# Patient Record
Sex: MALE | Race: OTHER | URBAN - METROPOLITAN AREA
[De-identification: names, ages, dates, MRNs, and addresses within clinical notes are randomized per-mention and may not be internally consistent; named-entity substitution may affect disease eponyms.]

---

## 2017-04-17 ENCOUNTER — OUTPATIENT (OUTPATIENT)
Dept: OUTPATIENT SERVICES | Facility: HOSPITAL | Age: 77
LOS: 1 days | Discharge: HOME | End: 2017-04-17

## 2017-06-27 DIAGNOSIS — R53.82 CHRONIC FATIGUE, UNSPECIFIED: ICD-10-CM

## 2018-04-05 ENCOUNTER — OUTPATIENT (OUTPATIENT)
Dept: OUTPATIENT SERVICES | Facility: HOSPITAL | Age: 78
LOS: 1 days | Discharge: HOME | End: 2018-04-05

## 2018-04-06 DIAGNOSIS — R10.11 RIGHT UPPER QUADRANT PAIN: ICD-10-CM

## 2018-04-06 DIAGNOSIS — E78.2 MIXED HYPERLIPIDEMIA: ICD-10-CM

## 2018-04-06 DIAGNOSIS — I11.9 HYPERTENSIVE HEART DISEASE WITHOUT HEART FAILURE: ICD-10-CM

## 2018-10-03 ENCOUNTER — OUTPATIENT (OUTPATIENT)
Dept: OUTPATIENT SERVICES | Facility: HOSPITAL | Age: 78
LOS: 1 days | Discharge: HOME | End: 2018-10-03

## 2018-10-04 DIAGNOSIS — I11.9 HYPERTENSIVE HEART DISEASE WITHOUT HEART FAILURE: ICD-10-CM

## 2018-10-04 DIAGNOSIS — R10.11 RIGHT UPPER QUADRANT PAIN: ICD-10-CM

## 2018-10-04 DIAGNOSIS — E78.2 MIXED HYPERLIPIDEMIA: ICD-10-CM

## 2021-12-26 ENCOUNTER — INPATIENT (INPATIENT)
Facility: HOSPITAL | Age: 81
LOS: 8 days | Discharge: HOME | End: 2022-01-04
Attending: UROLOGY | Admitting: UROLOGY
Payer: MEDICARE

## 2021-12-26 VITALS
DIASTOLIC BLOOD PRESSURE: 86 MMHG | SYSTOLIC BLOOD PRESSURE: 119 MMHG | HEIGHT: 68 IN | RESPIRATION RATE: 20 BRPM | TEMPERATURE: 98 F | WEIGHT: 119.93 LBS | OXYGEN SATURATION: 98 % | HEART RATE: 98 BPM

## 2021-12-26 DIAGNOSIS — Z98.890 OTHER SPECIFIED POSTPROCEDURAL STATES: Chronic | ICD-10-CM

## 2021-12-26 LAB
ALBUMIN SERPL ELPH-MCNC: 3.6 G/DL — SIGNIFICANT CHANGE UP (ref 3.3–5)
ALP SERPL-CCNC: 55 U/L — SIGNIFICANT CHANGE UP (ref 40–120)
ALT FLD-CCNC: 17 U/L — SIGNIFICANT CHANGE UP (ref 12–78)
ANION GAP SERPL CALC-SCNC: 5 MMOL/L — SIGNIFICANT CHANGE UP (ref 5–17)
APPEARANCE UR: ABNORMAL
APTT BLD: 21.1 SEC — LOW (ref 27.5–35.5)
AST SERPL-CCNC: 18 U/L — SIGNIFICANT CHANGE UP (ref 15–37)
BACTERIA # UR AUTO: ABNORMAL
BASOPHILS # BLD AUTO: 0.01 K/UL — SIGNIFICANT CHANGE UP (ref 0–0.2)
BASOPHILS NFR BLD AUTO: 0.2 % — SIGNIFICANT CHANGE UP (ref 0–2)
BILIRUB SERPL-MCNC: 0.5 MG/DL — SIGNIFICANT CHANGE UP (ref 0.2–1.2)
BILIRUB UR-MCNC: NEGATIVE — SIGNIFICANT CHANGE UP
BUN SERPL-MCNC: 16 MG/DL — SIGNIFICANT CHANGE UP (ref 7–23)
CALCIUM SERPL-MCNC: 9.2 MG/DL — SIGNIFICANT CHANGE UP (ref 8.5–10.1)
CHLORIDE SERPL-SCNC: 105 MMOL/L — SIGNIFICANT CHANGE UP (ref 96–108)
CO2 SERPL-SCNC: 28 MMOL/L — SIGNIFICANT CHANGE UP (ref 22–31)
COLOR SPEC: ABNORMAL
CREAT SERPL-MCNC: 1.27 MG/DL — SIGNIFICANT CHANGE UP (ref 0.5–1.3)
DIFF PNL FLD: ABNORMAL
EOSINOPHIL # BLD AUTO: 0 K/UL — SIGNIFICANT CHANGE UP (ref 0–0.5)
EOSINOPHIL NFR BLD AUTO: 0 % — SIGNIFICANT CHANGE UP (ref 0–6)
EPI CELLS # UR: SIGNIFICANT CHANGE UP
FLUAV AG NPH QL: SIGNIFICANT CHANGE UP
FLUBV AG NPH QL: SIGNIFICANT CHANGE UP
GLUCOSE SERPL-MCNC: 126 MG/DL — HIGH (ref 70–99)
GLUCOSE UR QL: NEGATIVE MG/DL — SIGNIFICANT CHANGE UP
HCT VFR BLD CALC: 33.7 % — LOW (ref 39–50)
HGB BLD-MCNC: 11.6 G/DL — LOW (ref 13–17)
IMM GRANULOCYTES NFR BLD AUTO: 0.2 % — SIGNIFICANT CHANGE UP (ref 0–1.5)
INR BLD: 1.11 RATIO — SIGNIFICANT CHANGE UP (ref 0.88–1.16)
KETONES UR-MCNC: ABNORMAL
LEUKOCYTE ESTERASE UR-ACNC: ABNORMAL
LYMPHOCYTES # BLD AUTO: 0.49 K/UL — LOW (ref 1–3.3)
LYMPHOCYTES # BLD AUTO: 7.9 % — LOW (ref 13–44)
MCHC RBC-ENTMCNC: 31.4 PG — SIGNIFICANT CHANGE UP (ref 27–34)
MCHC RBC-ENTMCNC: 34.4 GM/DL — SIGNIFICANT CHANGE UP (ref 32–36)
MCV RBC AUTO: 91.1 FL — SIGNIFICANT CHANGE UP (ref 80–100)
MONOCYTES # BLD AUTO: 0.26 K/UL — SIGNIFICANT CHANGE UP (ref 0–0.9)
MONOCYTES NFR BLD AUTO: 4.2 % — SIGNIFICANT CHANGE UP (ref 2–14)
NEUTROPHILS # BLD AUTO: 5.45 K/UL — SIGNIFICANT CHANGE UP (ref 1.8–7.4)
NEUTROPHILS NFR BLD AUTO: 87.5 % — HIGH (ref 43–77)
NITRITE UR-MCNC: POSITIVE
NRBC # BLD: 0 /100 WBCS — SIGNIFICANT CHANGE UP (ref 0–0)
PH UR: 7 — SIGNIFICANT CHANGE UP (ref 5–8)
PLATELET # BLD AUTO: 126 K/UL — LOW (ref 150–400)
POTASSIUM SERPL-MCNC: 4.6 MMOL/L — SIGNIFICANT CHANGE UP (ref 3.5–5.3)
POTASSIUM SERPL-SCNC: 4.6 MMOL/L — SIGNIFICANT CHANGE UP (ref 3.5–5.3)
PROT SERPL-MCNC: 7.7 GM/DL — SIGNIFICANT CHANGE UP (ref 6–8.3)
PROT UR-MCNC: 500 MG/DL
PROTHROM AB SERPL-ACNC: 12.8 SEC — SIGNIFICANT CHANGE UP (ref 10.6–13.6)
RBC # BLD: 3.7 M/UL — LOW (ref 4.2–5.8)
RBC # FLD: 12.5 % — SIGNIFICANT CHANGE UP (ref 10.3–14.5)
RBC CASTS # UR COMP ASSIST: >50 /HPF (ref 0–4)
SARS-COV-2 RNA SPEC QL NAA+PROBE: SIGNIFICANT CHANGE UP
SODIUM SERPL-SCNC: 138 MMOL/L — SIGNIFICANT CHANGE UP (ref 135–145)
SP GR SPEC: 1.01 — SIGNIFICANT CHANGE UP (ref 1.01–1.02)
UROBILINOGEN FLD QL: NEGATIVE MG/DL — SIGNIFICANT CHANGE UP
WBC # BLD: 6.22 K/UL — SIGNIFICANT CHANGE UP (ref 3.8–10.5)
WBC # FLD AUTO: 6.22 K/UL — SIGNIFICANT CHANGE UP (ref 3.8–10.5)
WBC UR QL: SIGNIFICANT CHANGE UP

## 2021-12-26 PROCEDURE — 74177 CT ABD & PELVIS W/CONTRAST: CPT | Mod: 26

## 2021-12-26 PROCEDURE — 99285 EMERGENCY DEPT VISIT HI MDM: CPT

## 2021-12-26 PROCEDURE — 88112 CYTOPATH CELL ENHANCE TECH: CPT | Mod: 26

## 2021-12-26 RX ORDER — TAMSULOSIN HYDROCHLORIDE 0.4 MG/1
0.4 CAPSULE ORAL EVERY 12 HOURS
Refills: 0 | Status: DISCONTINUED | OUTPATIENT
Start: 2021-12-26 | End: 2021-12-30

## 2021-12-26 RX ORDER — LIDOCAINE HCL 20 MG/ML
10 VIAL (ML) INJECTION ONCE
Refills: 0 | Status: COMPLETED | OUTPATIENT
Start: 2021-12-26 | End: 2021-12-26

## 2021-12-26 RX ORDER — TAMSULOSIN HYDROCHLORIDE 0.4 MG/1
1 CAPSULE ORAL
Qty: 0 | Refills: 0 | DISCHARGE

## 2021-12-26 RX ORDER — LIDOCAINE HCL 20 MG/ML
1 VIAL (ML) INJECTION ONCE
Refills: 0 | Status: DISCONTINUED | OUTPATIENT
Start: 2021-12-26 | End: 2021-12-26

## 2021-12-26 RX ORDER — LIDOCAINE HCL 20 MG/ML
5 VIAL (ML) INJECTION ONCE
Refills: 0 | Status: COMPLETED | OUTPATIENT
Start: 2021-12-26 | End: 2021-12-26

## 2021-12-26 RX ADMIN — Medication 10 MILLILITER(S): at 17:55

## 2021-12-26 RX ADMIN — Medication 10 MILLILITER(S): at 17:56

## 2021-12-26 RX ADMIN — TAMSULOSIN HYDROCHLORIDE 0.4 MILLIGRAM(S): 0.4 CAPSULE ORAL at 18:46

## 2021-12-26 NOTE — H&P ADULT - NSHPPHYSICALEXAM_GEN_ALL_CORE
Vital Signs Last 24 Hrs  T(C): 36.7 (26 Dec 2021 15:54), Max: 36.8 (26 Dec 2021 10:20)  T(F): 98.1 (26 Dec 2021 15:54), Max: 98.2 (26 Dec 2021 10:20)  HR: 91 (26 Dec 2021 15:54) (91 - 98)  BP: 122/74 (26 Dec 2021 15:54) (119/86 - 122/74)  RR: 20 (26 Dec 2021 15:54) (20 - 20)  SpO2: 97% (26 Dec 2021 15:54) (97% - 98%)    PHYSICAL EXAM:  GENERAL: NAD, well-groomed, well-developed  HEAD:  Atraumatic, Normocephalic  EYES: EOMI, PERRL, conjunctiva and sclera clear  ENMT: No tonsillar erythema, exudates, or enlargement; Moist mucous membranes  NECK: Supple, No JVD, Normal thyroid  NERVOUS SYSTEM:  Alert & Oriented X3, Good concentration; Motor Strength 5/5 B/L upper and lower extremities  CHEST/LUNG: Clear to auscultation bilaterally; No rales, rhonchi, wheezing, or rubs  HEART: Regular rate and rhythm; No murmurs appreciated  ABDOMEN: Soft, Nontender, Nondistended; Bowel sounds present  MSK: ROM intact in all extremities, 5/5 strength in upper and lower extremities, B/L.  : Severe phimosis, with minimal blood at meatus  EXTREMITIES:  2+ Peripheral Pulses, No clubbing, cyanosis, or edema  LYMPH: No lymphadenopathy noted  SKIN: No rashes or lesions

## 2021-12-26 NOTE — H&P ADULT - HISTORY OF PRESENT ILLNESS
82 YO M with a sig PMHx of HTN, and prostate cancer 2010 (was an radiation and went into remission). Patient states that he has not been able to urinate since yesterday, causing him severe 10/10 abdominal pain. He is unable to sit up, and only laying down makes him feel better. Denies D/C/N/V, fever, chills, chest pain or shortness of breath.

## 2021-12-26 NOTE — ED ADULT NURSE NOTE - OBJECTIVE STATEMENT
A&Ox4, c/o urinary retention, started yesterday around 1200 pm, pt states he had hematuria, urinated x2 yesterday, and no urination today, and denies burning with urination. pain 10/10 abdomen, constant, unable to characterize pain, denies GI symptoms. denies chest pain, sob, dizziness, nausea, vomiting, headache, blurry vision, chills

## 2021-12-26 NOTE — ED PROVIDER NOTE - OBJECTIVE STATEMENT
82yo male with pmh of htn (non compliant) prsents complaining of urinary retention since yesterday afternoon after episode of painful, bloody urine. Has not been able to even have a trickle. Reports abd discomfort in the pelvic region otherwise denies back pain, fevers/chills, trauma, nausea/vomiting, and other associated sx. Reports he has ever had an episode similar to this in the past. 80yo male with pmh of htn (non compliant) prsents complaining of urinary retention since yesterday afternoon after episode of painful, bloody urine. Has not been able to even have a trickle. Reports abd discomfort in the pelvic region otherwise denies back pain, fevers/chills, trauma, nausea/vomiting, and other associated sx. Reports he has never had an episode similar to this in the past.

## 2021-12-26 NOTE — ED PROVIDER NOTE - CLINICAL SUMMARY MEDICAL DECISION MAKING FREE TEXT BOX
80yo male with pmh of htn (non compliant) prsents complaining of urinary retention since yesterday afternoon after episode of painful, bloody urine. WEll appearing. Exam benign except tender bladder. Given hematuria preceding retention, most likely needs hoskins with irrigation. Will consult urology. Dispo pending work up.

## 2021-12-26 NOTE — ED PROVIDER NOTE - ATTENDING CONTRIBUTION TO CARE
Dichter: Pt seen w/ PA, 81M pw urinary retention, hematuria. Agree w/ planned w/u and dispo. Dichter: Pt seen w/ PA, 81M pw new onset urinary retention, hematuria x 1 day. Agree w/ planned w/u and dispo. Unable to place Winchester in ED, pt w/ bladder distension / enlarged prostate on POCUS, + blood at uretal meatus. D/w Uro Attending / surg PA, will admit to Uro service. Pt updated, understands / agrees w/ this plan. Dichter: Pt seen w/ PA, 81M pw new onset urinary retention, hematuria x 1 day. Agree w/ planned w/u and dispo. ED team unable to place Winchester, pt w/ bladder distension / enlarged prostate v mass on POCUS, + blood at uretal meatus. D/w Uro Attending / surg PA, will admit to Uro service. Uro placed Winchester in ED. Pt updated, understands / agrees w/ this plan.

## 2021-12-26 NOTE — ED PROVIDER NOTE - PHYSICAL EXAMINATION
PE:   GEN: Awake, alert, interactive, NAD, non-toxic appearing.   HEAD AND NECK: NC/AT. Airway patent. Neck supple.   EYES: Clear b/l. PERRL  CARDIAC: RRR. S1, S2. No evident pedal edema.    RESP: Normal respiratory effort with no use of accessory muscles or retractions. Clear throughout on auscultation.  ABD: soft, non-distended, tender bladder. No rebound, no guarding.   NEURO: AOx3, CN II-XII grossly intact, no focal deficits.   MSK: Moving all extremities with no apparent deformities.   SKIN: Warm, dry, intact normal color

## 2021-12-26 NOTE — ED PROVIDER NOTE - NS_EDPROVIDERDISPOUSERTYPE_ED_A_ED
Called patient to schedule for colonoscopy [procedure left voicemail to ask for Gio Pain Attending Attestation (For Attendings USE Only)...

## 2021-12-26 NOTE — ED PROVIDER NOTE - NS ED ROS FT
Constitutional: (-) Fever, (-) Anorexia, (-) Generalized Malaise  Eyes: (-)Discharge, (-) Irritation,  (-) Visual changes  EARS: (-) Ear Pain, (-) Apparent hearing changes  NOSE: (-) Congestion, (-) Bloody nose  MOUTH/THROAT: (-) Vocal Changes, (-) Drooling, (-) Sore throat  NECK: (-) Lumps, (-) Stiffness, (-) Pain  CV: (-) Chest Pain, (-) Palpitations, (-) Edema   RESP:  (-) Cough, (-) SOB, (-) COELLO,  (-) Wheezing  GI: (-) Nausea, (-) Vomiting, (-) Abdominal Pain, (-) Diarrhea, (-) Constipation, (-) Bloody stools  : (-) Dysuria, (-) Frequency, (+) Hematuria, (-) Incontinence, (+) Retention  MSK: (-) Joint Pain, (-) Back Pain, (-) Deformities  SKIN: (-) Wounds, (-) Color change, (-)Rash, (-) Swelling  NEURO:(-) Headache, (-) Dizziness, (-) Numbness/Tingling,  (-)LOC

## 2021-12-26 NOTE — ED ADULT NURSE REASSESSMENT NOTE - NS ED NURSE REASSESS COMMENT FT1
pt received from previous rn Shanika. Pt pending admission to floor for urine retention. Pt pending urine sample.

## 2021-12-27 LAB
ANION GAP SERPL CALC-SCNC: 6 MMOL/L — SIGNIFICANT CHANGE UP (ref 5–17)
BASOPHILS # BLD AUTO: 0.02 K/UL — SIGNIFICANT CHANGE UP (ref 0–0.2)
BASOPHILS NFR BLD AUTO: 0.2 % — SIGNIFICANT CHANGE UP (ref 0–2)
BUN SERPL-MCNC: 15 MG/DL — SIGNIFICANT CHANGE UP (ref 7–23)
CALCIUM SERPL-MCNC: 8.7 MG/DL — SIGNIFICANT CHANGE UP (ref 8.5–10.1)
CHLORIDE SERPL-SCNC: 109 MMOL/L — HIGH (ref 96–108)
CO2 SERPL-SCNC: 26 MMOL/L — SIGNIFICANT CHANGE UP (ref 22–31)
CREAT SERPL-MCNC: 0.96 MG/DL — SIGNIFICANT CHANGE UP (ref 0.5–1.3)
CULTURE RESULTS: NO GROWTH — SIGNIFICANT CHANGE UP
EOSINOPHIL # BLD AUTO: 0 K/UL — SIGNIFICANT CHANGE UP (ref 0–0.5)
EOSINOPHIL NFR BLD AUTO: 0 % — SIGNIFICANT CHANGE UP (ref 0–6)
GLUCOSE SERPL-MCNC: 110 MG/DL — HIGH (ref 70–99)
HCT VFR BLD CALC: 30.1 % — LOW (ref 39–50)
HGB BLD-MCNC: 10.2 G/DL — LOW (ref 13–17)
IMM GRANULOCYTES NFR BLD AUTO: 0.3 % — SIGNIFICANT CHANGE UP (ref 0–1.5)
LYMPHOCYTES # BLD AUTO: 1.12 K/UL — SIGNIFICANT CHANGE UP (ref 1–3.3)
LYMPHOCYTES # BLD AUTO: 11.7 % — LOW (ref 13–44)
MAGNESIUM SERPL-MCNC: 2.1 MG/DL — SIGNIFICANT CHANGE UP (ref 1.6–2.6)
MCHC RBC-ENTMCNC: 31.1 PG — SIGNIFICANT CHANGE UP (ref 27–34)
MCHC RBC-ENTMCNC: 33.9 GM/DL — SIGNIFICANT CHANGE UP (ref 32–36)
MCV RBC AUTO: 91.8 FL — SIGNIFICANT CHANGE UP (ref 80–100)
MONOCYTES # BLD AUTO: 1.07 K/UL — HIGH (ref 0–0.9)
MONOCYTES NFR BLD AUTO: 11.2 % — SIGNIFICANT CHANGE UP (ref 2–14)
NEUTROPHILS # BLD AUTO: 7.32 K/UL — SIGNIFICANT CHANGE UP (ref 1.8–7.4)
NEUTROPHILS NFR BLD AUTO: 76.6 % — SIGNIFICANT CHANGE UP (ref 43–77)
NRBC # BLD: 0 /100 WBCS — SIGNIFICANT CHANGE UP (ref 0–0)
PHOSPHATE SERPL-MCNC: 3.5 MG/DL — SIGNIFICANT CHANGE UP (ref 2.5–4.5)
PLATELET # BLD AUTO: 143 K/UL — LOW (ref 150–400)
POTASSIUM SERPL-MCNC: 3.6 MMOL/L — SIGNIFICANT CHANGE UP (ref 3.5–5.3)
POTASSIUM SERPL-SCNC: 3.6 MMOL/L — SIGNIFICANT CHANGE UP (ref 3.5–5.3)
RBC # BLD: 3.28 M/UL — LOW (ref 4.2–5.8)
RBC # FLD: 12.6 % — SIGNIFICANT CHANGE UP (ref 10.3–14.5)
SODIUM SERPL-SCNC: 141 MMOL/L — SIGNIFICANT CHANGE UP (ref 135–145)
SPECIMEN SOURCE: SIGNIFICANT CHANGE UP
WBC # BLD: 9.56 K/UL — SIGNIFICANT CHANGE UP (ref 3.8–10.5)
WBC # FLD AUTO: 9.56 K/UL — SIGNIFICANT CHANGE UP (ref 3.8–10.5)

## 2021-12-27 RX ORDER — CHLORHEXIDINE GLUCONATE 213 G/1000ML
1 SOLUTION TOPICAL DAILY
Refills: 0 | Status: DISCONTINUED | OUTPATIENT
Start: 2021-12-27 | End: 2021-12-30

## 2021-12-27 RX ADMIN — TAMSULOSIN HYDROCHLORIDE 0.4 MILLIGRAM(S): 0.4 CAPSULE ORAL at 05:38

## 2021-12-27 RX ADMIN — CHLORHEXIDINE GLUCONATE 1 APPLICATION(S): 213 SOLUTION TOPICAL at 11:40

## 2021-12-27 RX ADMIN — TAMSULOSIN HYDROCHLORIDE 0.4 MILLIGRAM(S): 0.4 CAPSULE ORAL at 18:16

## 2021-12-27 NOTE — PATIENT PROFILE ADULT - FALL HARM RISK - HARM RISK INTERVENTIONS

## 2021-12-27 NOTE — PROGRESS NOTE ADULT - SUBJECTIVE AND OBJECTIVE BOX
Indwelling hoskins with dark red urine in tubing.  Complaining of pain. Hoskins balloon taken down, hoskins adjusted, balloon reinflated and secured with new stat lock  Denies nausea and vomiting. Tolerating diet.  Denies chest pain, dyspnea, cough.    T(F): 97.8 (12-27-21 @ 11:09), Max: 98.9 (12-27-21 @ 05:10)  HR: 96 (12-27-21 @ 11:09) (87 - 97)  BP: 95/63 (12-27-21 @ 11:09) (95/63 - 122/74)  RR: 18 (12-27-21 @ 11:09) (17 - 20)  SpO2: 97% (12-27-21 @ 11:09) (95% - 97%)      PHYSICAL EXAM:    General: NAD, alert and awake  HEENT: NCAT, EOMI, conjunctiva clear  Chest: nonlabored respirations, CTA b/l.  Abdomen: soft, NT/ND.   Extremities: Calf soft, nontender b/l.   : No suprapubic tenderness or bladder distention. Indwelling hoskins with dark red urine in tubing.    LABS:                        10.2   9.56  )-----------( 143      ( 27 Dec 2021 07:25 )             30.1   12-27    141  |  109<H>  |  15  ----------------------------<  110<H>  3.6   |  26  |  0.96    Ca    8.7      27 Dec 2021 07:25  Phos  3.5     12-27  Mg     2.1     12-27    TPro  7.7  /  Alb  3.6  /  TBili  0.5  /  DBili  x   /  AST  18  /  ALT  17  /  AlkPhos  55  12-26  PT/INR - ( 26 Dec 2021 11:38 )   PT: 12.8 sec;   INR: 1.11 ratio         PTT - ( 26 Dec 2021 11:38 )  PTT:21.1 sec  I&O's Detail    26 Dec 2021 07:01  -  27 Dec 2021 07:00  --------------------------------------------------------  IN:    Oral Fluid: 420 mL  Total IN: 420 mL    OUT:    Voided (mL): 900 mL  Total OUT: 900 mL    Total NET: -480 mL

## 2021-12-27 NOTE — PROGRESS NOTE ADULT - ASSESSMENT
Impression: 80 YO M with a sig PMHx of HTN, and prostate cancer 2010 (was an radiation and went into remission). Consult called for urinary retention. 16F Winchester placed in ED, with 30cc dark red blood returned. Proceeded to irrigate Winchester and 800cc of continued dark red urine.      Plan:  --Urine cytology   --F/u urine cx  --Outpatient cystoscopy  --CTU eventually

## 2021-12-28 LAB
ANION GAP SERPL CALC-SCNC: 5 MMOL/L — SIGNIFICANT CHANGE UP (ref 5–17)
BUN SERPL-MCNC: 20 MG/DL — SIGNIFICANT CHANGE UP (ref 7–23)
CALCIUM SERPL-MCNC: 8.5 MG/DL — SIGNIFICANT CHANGE UP (ref 8.5–10.1)
CHLORIDE SERPL-SCNC: 106 MMOL/L — SIGNIFICANT CHANGE UP (ref 96–108)
CO2 SERPL-SCNC: 28 MMOL/L — SIGNIFICANT CHANGE UP (ref 22–31)
CREAT SERPL-MCNC: 0.85 MG/DL — SIGNIFICANT CHANGE UP (ref 0.5–1.3)
GLUCOSE SERPL-MCNC: 122 MG/DL — HIGH (ref 70–99)
HCT VFR BLD CALC: 28 % — LOW (ref 39–50)
HGB BLD-MCNC: 9.5 G/DL — LOW (ref 13–17)
MCHC RBC-ENTMCNC: 31.5 PG — SIGNIFICANT CHANGE UP (ref 27–34)
MCHC RBC-ENTMCNC: 33.9 GM/DL — SIGNIFICANT CHANGE UP (ref 32–36)
MCV RBC AUTO: 92.7 FL — SIGNIFICANT CHANGE UP (ref 80–100)
NRBC # BLD: 0 /100 WBCS — SIGNIFICANT CHANGE UP (ref 0–0)
PLATELET # BLD AUTO: 126 K/UL — LOW (ref 150–400)
POTASSIUM SERPL-MCNC: 3.6 MMOL/L — SIGNIFICANT CHANGE UP (ref 3.5–5.3)
POTASSIUM SERPL-SCNC: 3.6 MMOL/L — SIGNIFICANT CHANGE UP (ref 3.5–5.3)
RBC # BLD: 3.02 M/UL — LOW (ref 4.2–5.8)
RBC # FLD: 12.5 % — SIGNIFICANT CHANGE UP (ref 10.3–14.5)
SODIUM SERPL-SCNC: 139 MMOL/L — SIGNIFICANT CHANGE UP (ref 135–145)
WBC # BLD: 10.44 K/UL — SIGNIFICANT CHANGE UP (ref 3.8–10.5)
WBC # FLD AUTO: 10.44 K/UL — SIGNIFICANT CHANGE UP (ref 3.8–10.5)

## 2021-12-28 PROCEDURE — 88112 CYTOPATH CELL ENHANCE TECH: CPT | Mod: 26

## 2021-12-28 RX ORDER — OXYCODONE AND ACETAMINOPHEN 5; 325 MG/1; MG/1
2 TABLET ORAL EVERY 6 HOURS
Refills: 0 | Status: DISCONTINUED | OUTPATIENT
Start: 2021-12-28 | End: 2021-12-30

## 2021-12-28 RX ORDER — OXYCODONE AND ACETAMINOPHEN 5; 325 MG/1; MG/1
1 TABLET ORAL EVERY 4 HOURS
Refills: 0 | Status: DISCONTINUED | OUTPATIENT
Start: 2021-12-28 | End: 2021-12-30

## 2021-12-28 RX ORDER — ACETAMINOPHEN 500 MG
650 TABLET ORAL EVERY 4 HOURS
Refills: 0 | Status: DISCONTINUED | OUTPATIENT
Start: 2021-12-28 | End: 2021-12-30

## 2021-12-28 RX ORDER — LANOLIN ALCOHOL/MO/W.PET/CERES
3 CREAM (GRAM) TOPICAL AT BEDTIME
Refills: 0 | Status: DISCONTINUED | OUTPATIENT
Start: 2021-12-28 | End: 2021-12-30

## 2021-12-28 RX ADMIN — TAMSULOSIN HYDROCHLORIDE 0.4 MILLIGRAM(S): 0.4 CAPSULE ORAL at 05:44

## 2021-12-28 RX ADMIN — TAMSULOSIN HYDROCHLORIDE 0.4 MILLIGRAM(S): 0.4 CAPSULE ORAL at 17:43

## 2021-12-28 RX ADMIN — Medication 650 MILLIGRAM(S): at 14:05

## 2021-12-28 RX ADMIN — CHLORHEXIDINE GLUCONATE 1 APPLICATION(S): 213 SOLUTION TOPICAL at 11:09

## 2021-12-28 RX ADMIN — Medication 650 MILLIGRAM(S): at 13:00

## 2021-12-28 NOTE — PROGRESS NOTE ADULT - ASSESSMENT
Impression: 80 YO M with a sig PMHx of HTN, and prostate cancer 2010 (was an radiation and went into remission). Consult called for urinary retention. 15F Winchester placed in ED, with 30cc dark red blood returned. Proceeded to irrigate Winchester and 800cc of continued dark red urine. Ucx no growth      Plan:  --F/u urine cytology   --Outpatient cystoscopy  --F/u CTU

## 2021-12-28 NOTE — PROGRESS NOTE ADULT - SUBJECTIVE AND OBJECTIVE BOX
On AM rounds pt complaining of lower abdominal pain.   Saw that hoskins was again pulled into the urethra, once the hoskins was repositioned approx 500cc bloody urine drained  With catheter tipped syringe, another 700cc of bloody urine removed from the bladder.   Hand irrigated with clot removed.   Attempted placement of 24Fr hematuria but unable to advance past the prostate  Placed 20Fr 3-way and connected to CBI      T(F): 97.8 (12-28-21 @ 11:22), Max: 99.2 (12-27-21 @ 17:07)  HR: 111 (12-28-21 @ 11:22) (100 - 111)  BP: 90/61 (12-28-21 @ 11:22) (90/61 - 103/68)  RR: 18 (12-28-21 @ 11:22) (18 - 18)  SpO2: 97% (12-28-21 @ 11:22) (96% - 98%)    PHYSICAL EXAM:    General: NAD, alert and awake  HEENT: NCAT, EOMI, conjunctiva clear  Chest: nonlabored respirations, CTA b/l.  Abdomen: soft, NT/ND.   Extremities: Calf soft, nontender b/l.   : Tenderness prior to irrigation.    LABS:                        9.5    10.44 )-----------( 126      ( 28 Dec 2021 06:22 )             28.0   12-28    139  |  106  |  20  ----------------------------<  122<H>  3.6   |  28  |  0.85    Ca    8.5      28 Dec 2021 06:22  Phos  3.5     12-27  Mg     2.1     12-27      I&O's Detail

## 2021-12-29 LAB
ANION GAP SERPL CALC-SCNC: 6 MMOL/L — SIGNIFICANT CHANGE UP (ref 5–17)
BLD GP AB SCN SERPL QL: SIGNIFICANT CHANGE UP
BUN SERPL-MCNC: 21 MG/DL — SIGNIFICANT CHANGE UP (ref 7–23)
CALCIUM SERPL-MCNC: 8.2 MG/DL — LOW (ref 8.5–10.1)
CHLORIDE SERPL-SCNC: 105 MMOL/L — SIGNIFICANT CHANGE UP (ref 96–108)
CO2 SERPL-SCNC: 29 MMOL/L — SIGNIFICANT CHANGE UP (ref 22–31)
CREAT SERPL-MCNC: 0.9 MG/DL — SIGNIFICANT CHANGE UP (ref 0.5–1.3)
FLUAV AG NPH QL: SIGNIFICANT CHANGE UP
FLUBV AG NPH QL: SIGNIFICANT CHANGE UP
GLUCOSE SERPL-MCNC: 118 MG/DL — HIGH (ref 70–99)
HCT VFR BLD CALC: 24.8 % — LOW (ref 39–50)
HCT VFR BLD CALC: 25.3 % — LOW (ref 39–50)
HGB BLD-MCNC: 8.4 G/DL — LOW (ref 13–17)
HGB BLD-MCNC: 8.4 G/DL — LOW (ref 13–17)
MCHC RBC-ENTMCNC: 30.2 PG — SIGNIFICANT CHANGE UP (ref 27–34)
MCHC RBC-ENTMCNC: 31.9 PG — SIGNIFICANT CHANGE UP (ref 27–34)
MCHC RBC-ENTMCNC: 33.2 GM/DL — SIGNIFICANT CHANGE UP (ref 32–36)
MCHC RBC-ENTMCNC: 33.9 GM/DL — SIGNIFICANT CHANGE UP (ref 32–36)
MCV RBC AUTO: 91 FL — SIGNIFICANT CHANGE UP (ref 80–100)
MCV RBC AUTO: 94.3 FL — SIGNIFICANT CHANGE UP (ref 80–100)
NRBC # BLD: 0 /100 WBCS — SIGNIFICANT CHANGE UP (ref 0–0)
NRBC # BLD: 0 /100 WBCS — SIGNIFICANT CHANGE UP (ref 0–0)
PLATELET # BLD AUTO: 129 K/UL — LOW (ref 150–400)
PLATELET # BLD AUTO: 131 K/UL — LOW (ref 150–400)
POTASSIUM SERPL-MCNC: 4 MMOL/L — SIGNIFICANT CHANGE UP (ref 3.5–5.3)
POTASSIUM SERPL-SCNC: 4 MMOL/L — SIGNIFICANT CHANGE UP (ref 3.5–5.3)
RBC # BLD: 2.63 M/UL — LOW (ref 4.2–5.8)
RBC # BLD: 2.78 M/UL — LOW (ref 4.2–5.8)
RBC # FLD: 12.9 % — SIGNIFICANT CHANGE UP (ref 10.3–14.5)
RBC # FLD: 14.3 % — SIGNIFICANT CHANGE UP (ref 10.3–14.5)
SARS-COV-2 RNA SPEC QL NAA+PROBE: SIGNIFICANT CHANGE UP
SODIUM SERPL-SCNC: 140 MMOL/L — SIGNIFICANT CHANGE UP (ref 135–145)
WBC # BLD: 8.33 K/UL — SIGNIFICANT CHANGE UP (ref 3.8–10.5)
WBC # BLD: 9.89 K/UL — SIGNIFICANT CHANGE UP (ref 3.8–10.5)
WBC # FLD AUTO: 8.33 K/UL — SIGNIFICANT CHANGE UP (ref 3.8–10.5)
WBC # FLD AUTO: 9.89 K/UL — SIGNIFICANT CHANGE UP (ref 3.8–10.5)

## 2021-12-29 PROCEDURE — 99221 1ST HOSP IP/OBS SF/LOW 40: CPT

## 2021-12-29 RX ORDER — METOPROLOL TARTRATE 50 MG
25 TABLET ORAL DAILY
Refills: 0 | Status: DISCONTINUED | OUTPATIENT
Start: 2021-12-29 | End: 2021-12-29

## 2021-12-29 RX ORDER — SODIUM CHLORIDE 9 MG/ML
1000 INJECTION, SOLUTION INTRAVENOUS
Refills: 0 | Status: DISCONTINUED | OUTPATIENT
Start: 2021-12-29 | End: 2021-12-30

## 2021-12-29 RX ADMIN — TAMSULOSIN HYDROCHLORIDE 0.4 MILLIGRAM(S): 0.4 CAPSULE ORAL at 18:11

## 2021-12-29 RX ADMIN — CHLORHEXIDINE GLUCONATE 1 APPLICATION(S): 213 SOLUTION TOPICAL at 18:12

## 2021-12-29 RX ADMIN — SODIUM CHLORIDE 100 MILLILITER(S): 9 INJECTION, SOLUTION INTRAVENOUS at 18:58

## 2021-12-29 RX ADMIN — TAMSULOSIN HYDROCHLORIDE 0.4 MILLIGRAM(S): 0.4 CAPSULE ORAL at 05:04

## 2021-12-29 NOTE — DIETITIAN INITIAL EVALUATION ADULT. - OTHER CALCULATIONS
Ht (cm): 172.7cm   Wt (kg): 54.4kg (dosing weight 12/26)    BMI: 18.2     IBW: 69.8kg +/- 10%  %IBW: 78% UBW: 54.4kg %UBW: 100%

## 2021-12-29 NOTE — PROGRESS NOTE ADULT - SUBJECTIVE AND OBJECTIVE BOX
Patient seen and examined bedside resting comfortably.  No complaints offered.   Seems to have pain when the CBI runs dry  Denies nausea and vomiting. Tolerating diet.  Denies chest pain, dyspnea, cough.    T(F): 97.8 (12-29-21 @ 05:16), Max: 98 (12-28-21 @ 17:05)  HR: 116 (12-29-21 @ 05:16) (109 - 116)  BP: 95/61 (12-29-21 @ 05:16) (95/61 - 102/65)  RR: 18 (12-29-21 @ 05:16) (17 - 18)  SpO2: 98% (12-29-21 @ 05:16) (97% - 98%)      PHYSICAL EXAM:    General: NAD, alert and awake  HEENT: NCAT, EOMI, conjunctiva clear  Chest: nonlabored respirations, CTA b/l.  Abdomen: soft, NT/ND.   Extremities: Calf soft, nontender b/l.   : No suprapubic tenderness or bladder distention.  CBI running     LABS:                        8.4    9.89  )-----------( 131      ( 29 Dec 2021 06:29 )             24.8   12-29    140  |  105  |  21  ----------------------------<  118<H>  4.0   |  29  |  0.90    Ca    8.2<L>      29 Dec 2021 06:29

## 2021-12-29 NOTE — PROGRESS NOTE ADULT - ASSESSMENT
Impression: 80 YO M with a sig PMHx of HTN, and prostate cancer 2010 (was an radiation and went into remission). Consult called for urinary retention. Switched to CBI 12/28. HGB 8.4/24, medicine co management, will require 2uPRBCs today,       Plan:  --F/u urine cytology   ---Will consider OR for cystoscopy this admission  --Appreciate medicine co management   --F/u CTU

## 2021-12-29 NOTE — DIETITIAN INITIAL EVALUATION ADULT. - OTHER INFO
Pt reports good appetite and PO intake for dinner last night (12/28) and breakfast this morning (12/29). Prior to that was consuming 25-50% of documented meals. Denies difficulty chewing/swallowing. Reports he feels like he has lost some weight recently; states his weight usually fluctuates: -120 pounds. Weight stable as per dosing weight this admission (12/26) 120 pounds.   Briefly discussed optimizing PO intake with pt. Pt made aware RD remains available.

## 2021-12-29 NOTE — DIETITIAN INITIAL EVALUATION ADULT. - PERTINENT MEDS FT
MEDICATIONS  (STANDING):  chlorhexidine 2% Cloths 1 Application(s) Topical daily  tamsulosin 0.4 milliGRAM(s) Oral every 12 hours    MEDICATIONS  (PRN):  acetaminophen     Tablet .. 650 milliGRAM(s) Oral every 4 hours PRN Mild Pain (1 - 3)  melatonin 3 milliGRAM(s) Oral at bedtime PRN Insomnia  oxycodone    5 mG/acetaminophen 325 mG 1 Tablet(s) Oral every 4 hours PRN Moderate Pain (4 - 6)  oxycodone    5 mG/acetaminophen 325 mG 2 Tablet(s) Oral every 6 hours PRN Severe Pain (7 - 10)

## 2021-12-29 NOTE — DIETITIAN INITIAL EVALUATION ADULT. - ORAL INTAKE PTA/DIET HISTORY
Pt reports poor appetite and PO intake PTA however not clear about how long. States he shops/cooks for himself PTA. Reports no diet restrictions PTA.

## 2021-12-29 NOTE — DIETITIAN INITIAL EVALUATION ADULT. - PERTINENT LABORATORY DATA
12-29 Na140 mmol/L Glu 118 mg/dL<H> K+ 4.0 mmol/L Cr  0.90 mg/dL BUN 21 mg/dL 12-27 Phos 3.5 mg/dL 12-26 Alb 3.6 g/dL

## 2021-12-29 NOTE — DIETITIAN NUTRITION RISK NOTIFICATION - TREATMENT: THE FOLLOWING DIET HAS BEEN RECOMMENDED
Diet, Regular:   Low Sodium  Supplement Feeding Modality:  Oral  Health Shake Cans or Servings Per Day:  1       Frequency:  Daily (12-29-21 @ 14:03) [Pending Verification By Attending]  Diet, DASH/TLC:   Sodium & Cholesterol Restricted (12-26-21 @ 17:42) [Active]

## 2021-12-29 NOTE — CONSULT NOTE ADULT - ASSESSMENT
82 YO M with a sig PMHx of HTN, and prostate cancer 2010 (was an radiation and went into remission). admitted for for urinary retention. Indwelling Winchester with dark red urine in tubing.    Urinary retention  acute blood loss anemia  Gross hematuria  HTN now hypotensive  prostate cancer  -PRBC X 1 units, check cbc after  -start on ivf  -Winchester care  -CT abd pending  -Flomax

## 2021-12-30 LAB
ANION GAP SERPL CALC-SCNC: 4 MMOL/L — LOW (ref 5–17)
BUN SERPL-MCNC: 12 MG/DL — SIGNIFICANT CHANGE UP (ref 7–23)
CALCIUM SERPL-MCNC: 8.1 MG/DL — LOW (ref 8.5–10.1)
CHLORIDE SERPL-SCNC: 103 MMOL/L — SIGNIFICANT CHANGE UP (ref 96–108)
CO2 SERPL-SCNC: 29 MMOL/L — SIGNIFICANT CHANGE UP (ref 22–31)
CREAT SERPL-MCNC: 0.69 MG/DL — SIGNIFICANT CHANGE UP (ref 0.5–1.3)
GLUCOSE SERPL-MCNC: 111 MG/DL — HIGH (ref 70–99)
HCT VFR BLD CALC: 23.9 % — LOW (ref 39–50)
HGB BLD-MCNC: 7.9 G/DL — LOW (ref 13–17)
MCHC RBC-ENTMCNC: 30.3 PG — SIGNIFICANT CHANGE UP (ref 27–34)
MCHC RBC-ENTMCNC: 33.1 GM/DL — SIGNIFICANT CHANGE UP (ref 32–36)
MCV RBC AUTO: 91.6 FL — SIGNIFICANT CHANGE UP (ref 80–100)
NRBC # BLD: 0 /100 WBCS — SIGNIFICANT CHANGE UP (ref 0–0)
PLATELET # BLD AUTO: 129 K/UL — LOW (ref 150–400)
POTASSIUM SERPL-MCNC: 4 MMOL/L — SIGNIFICANT CHANGE UP (ref 3.5–5.3)
POTASSIUM SERPL-SCNC: 4 MMOL/L — SIGNIFICANT CHANGE UP (ref 3.5–5.3)
RBC # BLD: 2.61 M/UL — LOW (ref 4.2–5.8)
RBC # FLD: 15.1 % — HIGH (ref 10.3–14.5)
SODIUM SERPL-SCNC: 136 MMOL/L — SIGNIFICANT CHANGE UP (ref 135–145)
WBC # BLD: 6.08 K/UL — SIGNIFICANT CHANGE UP (ref 3.8–10.5)
WBC # FLD AUTO: 6.08 K/UL — SIGNIFICANT CHANGE UP (ref 3.8–10.5)

## 2021-12-30 PROCEDURE — 88305 TISSUE EXAM BY PATHOLOGIST: CPT | Mod: 26

## 2021-12-30 PROCEDURE — 99233 SBSQ HOSP IP/OBS HIGH 50: CPT

## 2021-12-30 RX ORDER — TAMSULOSIN HYDROCHLORIDE 0.4 MG/1
0.4 CAPSULE ORAL EVERY 12 HOURS
Refills: 0 | Status: DISCONTINUED | OUTPATIENT
Start: 2021-12-30 | End: 2022-01-04

## 2021-12-30 RX ORDER — ACETAMINOPHEN 500 MG
650 TABLET ORAL EVERY 4 HOURS
Refills: 0 | Status: DISCONTINUED | OUTPATIENT
Start: 2021-12-30 | End: 2022-01-04

## 2021-12-30 RX ORDER — PHENAZOPYRIDINE HCL 100 MG
100 TABLET ORAL EVERY 8 HOURS
Refills: 0 | Status: COMPLETED | OUTPATIENT
Start: 2021-12-30 | End: 2022-01-01

## 2021-12-30 RX ORDER — FENTANYL CITRATE 50 UG/ML
25 INJECTION INTRAVENOUS
Refills: 0 | Status: DISCONTINUED | OUTPATIENT
Start: 2021-12-30 | End: 2021-12-30

## 2021-12-30 RX ORDER — METOCLOPRAMIDE HCL 10 MG
10 TABLET ORAL ONCE
Refills: 0 | Status: DISCONTINUED | OUTPATIENT
Start: 2021-12-30 | End: 2021-12-30

## 2021-12-30 RX ORDER — SODIUM CHLORIDE 9 MG/ML
1000 INJECTION, SOLUTION INTRAVENOUS
Refills: 0 | Status: DISCONTINUED | OUTPATIENT
Start: 2021-12-30 | End: 2021-12-30

## 2021-12-30 RX ORDER — SODIUM CHLORIDE 9 MG/ML
1000 INJECTION, SOLUTION INTRAVENOUS
Refills: 0 | Status: DISCONTINUED | OUTPATIENT
Start: 2021-12-30 | End: 2021-12-31

## 2021-12-30 RX ORDER — ONDANSETRON 8 MG/1
4 TABLET, FILM COATED ORAL EVERY 6 HOURS
Refills: 0 | Status: DISCONTINUED | OUTPATIENT
Start: 2021-12-30 | End: 2022-01-04

## 2021-12-30 RX ORDER — LANOLIN ALCOHOL/MO/W.PET/CERES
3 CREAM (GRAM) TOPICAL AT BEDTIME
Refills: 0 | Status: DISCONTINUED | OUTPATIENT
Start: 2021-12-30 | End: 2022-01-04

## 2021-12-30 RX ADMIN — FENTANYL CITRATE 25 MICROGRAM(S): 50 INJECTION INTRAVENOUS at 19:45

## 2021-12-30 RX ADMIN — Medication 100 MILLIGRAM(S): at 22:26

## 2021-12-30 RX ADMIN — TAMSULOSIN HYDROCHLORIDE 0.4 MILLIGRAM(S): 0.4 CAPSULE ORAL at 05:58

## 2021-12-30 RX ADMIN — Medication 650 MILLIGRAM(S): at 05:59

## 2021-12-30 RX ADMIN — OXYCODONE AND ACETAMINOPHEN 2 TABLET(S): 5; 325 TABLET ORAL at 12:07

## 2021-12-30 RX ADMIN — SODIUM CHLORIDE 100 MILLILITER(S): 9 INJECTION, SOLUTION INTRAVENOUS at 01:02

## 2021-12-30 RX ADMIN — OXYCODONE AND ACETAMINOPHEN 2 TABLET(S): 5; 325 TABLET ORAL at 11:34

## 2021-12-30 RX ADMIN — FENTANYL CITRATE 25 MICROGRAM(S): 50 INJECTION INTRAVENOUS at 19:33

## 2021-12-30 RX ADMIN — SODIUM CHLORIDE 65 MILLILITER(S): 9 INJECTION, SOLUTION INTRAVENOUS at 19:11

## 2021-12-30 RX ADMIN — CHLORHEXIDINE GLUCONATE 1 APPLICATION(S): 213 SOLUTION TOPICAL at 11:35

## 2021-12-30 RX ADMIN — Medication 650 MILLIGRAM(S): at 06:31

## 2021-12-30 NOTE — CONSULT NOTE ADULT - ASSESSMENT
80 YO M with a sig PMHx of HTN, and prostate cancer 2010 (was an radiation and went into remission). admitted for for urinary retention. Indwelling Winchester with dark red urine in tubing.    Urinary retention  Patient pending cystoscopy today   H/H: 7.9  acute blood loss anemia  Gross hematuria    HTN now hypotensive (currently stable)   - CW IVFs,   - Hypotension 2/2 to multifactorial --> anemia, pain medications  - Monitor H/H    prostate cancer  -PRBC X 1 unitsr  -start on ivf  -Winchester care  < from: CT Abdomen and Pelvis w/ IV Cont (12.26.21 @ 22:13) >  MPRESSION:  Winchester catheter within the urinary bladder. Diffuse heterogeneous material   within the urinary bladder likely representing hemorrhage.-Flomax

## 2021-12-30 NOTE — BRIEF OPERATIVE NOTE - OPERATION/FINDINGS
mild venous bleeding/oozing at the bladder neck and bladder base/cystitis. Posterior wall inflammation biopsied

## 2021-12-30 NOTE — BRIEF OPERATIVE NOTE - NSICDXBRIEFPROCEDURE_GEN_ALL_CORE_FT
PROCEDURES:  Cystoscopy, with clot evacuation 30-Dec-2021 19:09:00  Sagar Taylor  Bladder biopsy 30-Dec-2021 19:09:19  Sagar Taylor  Cystoscopy with fulguration of minor lesion of bladder 30-Dec-2021 19:10:10  Sagar Taylor

## 2021-12-30 NOTE — PROGRESS NOTE ADULT - SUBJECTIVE AND OBJECTIVE BOX
Patient seen and examined bedside resting comfortably.  Complaining of occasional tightness in suprapubic area   CBI running with clotted urine on return   NPO after breakfast  Transfused two units yesterday however, no improvement in H/H  Plan for OR today for cystoscopy, clot evacuation      T(F): 98.9 (12-30-21 @ 05:32), Max: 99.1 (12-30-21 @ 00:37)  HR: 90 (12-30-21 @ 05:32) (90 - 103)  BP: 104/67 (12-30-21 @ 05:32) (76/47 - 104/67)  RR: 18 (12-30-21 @ 05:32) (17 - 18)  SpO2: 99% (12-30-21 @ 05:32) (97% - 100%)    PHYSICAL EXAM:    General: NAD, alert and awake  HEENT: NCAT, EOMI, conjunctiva clear  Chest: nonlabored respirations, CTA b/l.  OAbdomen: soft, NT/ND.   Extremities: Calf soft, nontender b/l.   : No suprapubic tenderness or bladder distention.  CBI running with clotted urine on return     LABS:                        7.9    6.08  )-----------( 129      ( 30 Dec 2021 08:33 )             23.9   12-30    136  |  103  |  12  ----------------------------<  111<H>  4.0   |  29  |  0.69    Ca    8.1<L>      30 Dec 2021 08:33      I&O's Detail    29 Dec 2021 07:01  -  30 Dec 2021 07:00  --------------------------------------------------------  IN:  Total IN: 0 mL    OUT:    Voided (mL): 5000 mL  Total OUT: 5000 mL    Total NET: -5000 mL

## 2021-12-30 NOTE — BRIEF OPERATIVE NOTE - NSICDXBRIEFPREOP_GEN_ALL_CORE_FT
PRE-OP DIAGNOSIS:  Gross hematuria 30-Dec-2021 19:10:28  Sagar Taylor  Anemia due to acute blood loss 30-Dec-2021 19:10:46  Sagar Taylor

## 2021-12-30 NOTE — BRIEF OPERATIVE NOTE - NSICDXBRIEFPOSTOP_GEN_ALL_CORE_FT
POST-OP DIAGNOSIS:  Gross hematuria 30-Dec-2021 19:11:22  Sagar Taylor  Anemia due to acute blood loss 30-Dec-2021 19:11:40  Sagar Taylor

## 2021-12-30 NOTE — PROGRESS NOTE ADULT - ASSESSMENT
Impression: 82 YO M with a sig PMHx of HTN, and prostate cancer 2010 (was an radiation and went into remission). Consult called for urinary retention. Switched to CBI 12/28. HGB 8.4/24, medicine co management, will require 2uPRBCs today,       Plan:  --NPO   --Consented and booked for OR today for cystoscopy, clot evacuation, possible circumcision   --Will transfuse an additional unit  --Appreciate medicine co management

## 2021-12-31 LAB
ANION GAP SERPL CALC-SCNC: 6 MMOL/L — SIGNIFICANT CHANGE UP (ref 5–17)
BASOPHILS # BLD AUTO: 0.03 K/UL — SIGNIFICANT CHANGE UP (ref 0–0.2)
BASOPHILS NFR BLD AUTO: 0.5 % — SIGNIFICANT CHANGE UP (ref 0–2)
BUN SERPL-MCNC: 7 MG/DL — SIGNIFICANT CHANGE UP (ref 7–23)
CALCIUM SERPL-MCNC: 8 MG/DL — LOW (ref 8.5–10.1)
CHLORIDE SERPL-SCNC: 99 MMOL/L — SIGNIFICANT CHANGE UP (ref 96–108)
CO2 SERPL-SCNC: 28 MMOL/L — SIGNIFICANT CHANGE UP (ref 22–31)
CREAT SERPL-MCNC: 0.69 MG/DL — SIGNIFICANT CHANGE UP (ref 0.5–1.3)
EOSINOPHIL # BLD AUTO: 0.13 K/UL — SIGNIFICANT CHANGE UP (ref 0–0.5)
EOSINOPHIL NFR BLD AUTO: 2 % — SIGNIFICANT CHANGE UP (ref 0–6)
GLUCOSE SERPL-MCNC: 106 MG/DL — HIGH (ref 70–99)
HCT VFR BLD CALC: 29.7 % — LOW (ref 39–50)
HGB BLD-MCNC: 10 G/DL — LOW (ref 13–17)
IMM GRANULOCYTES NFR BLD AUTO: 0.5 % — SIGNIFICANT CHANGE UP (ref 0–1.5)
IRON SATN MFR SERPL: 11 UG/DL — LOW (ref 45–165)
IRON SATN MFR SERPL: 7 % — LOW (ref 16–55)
LYMPHOCYTES # BLD AUTO: 0.71 K/UL — LOW (ref 1–3.3)
LYMPHOCYTES # BLD AUTO: 11.1 % — LOW (ref 13–44)
MCHC RBC-ENTMCNC: 29.9 PG — SIGNIFICANT CHANGE UP (ref 27–34)
MCHC RBC-ENTMCNC: 33.7 GM/DL — SIGNIFICANT CHANGE UP (ref 32–36)
MCV RBC AUTO: 88.9 FL — SIGNIFICANT CHANGE UP (ref 80–100)
MONOCYTES # BLD AUTO: 0.95 K/UL — HIGH (ref 0–0.9)
MONOCYTES NFR BLD AUTO: 14.9 % — HIGH (ref 2–14)
NEUTROPHILS # BLD AUTO: 4.53 K/UL — SIGNIFICANT CHANGE UP (ref 1.8–7.4)
NEUTROPHILS NFR BLD AUTO: 71 % — SIGNIFICANT CHANGE UP (ref 43–77)
NRBC # BLD: 0 /100 WBCS — SIGNIFICANT CHANGE UP (ref 0–0)
PLATELET # BLD AUTO: 147 K/UL — LOW (ref 150–400)
POTASSIUM SERPL-MCNC: 4.1 MMOL/L — SIGNIFICANT CHANGE UP (ref 3.5–5.3)
POTASSIUM SERPL-SCNC: 4.1 MMOL/L — SIGNIFICANT CHANGE UP (ref 3.5–5.3)
RBC # BLD: 3.34 M/UL — LOW (ref 4.2–5.8)
RBC # FLD: 14.7 % — HIGH (ref 10.3–14.5)
SODIUM SERPL-SCNC: 133 MMOL/L — LOW (ref 135–145)
TIBC SERPL-MCNC: 163 UG/DL — LOW (ref 220–430)
UIBC SERPL-MCNC: 153 UG/DL — SIGNIFICANT CHANGE UP (ref 110–370)
WBC # BLD: 6.38 K/UL — SIGNIFICANT CHANGE UP (ref 3.8–10.5)
WBC # FLD AUTO: 6.38 K/UL — SIGNIFICANT CHANGE UP (ref 3.8–10.5)

## 2021-12-31 PROCEDURE — 99233 SBSQ HOSP IP/OBS HIGH 50: CPT

## 2021-12-31 RX ORDER — IRON SUCROSE 20 MG/ML
200 INJECTION, SOLUTION INTRAVENOUS EVERY 24 HOURS
Refills: 0 | Status: COMPLETED | OUTPATIENT
Start: 2021-12-31 | End: 2022-01-02

## 2021-12-31 RX ORDER — OXYCODONE AND ACETAMINOPHEN 5; 325 MG/1; MG/1
2 TABLET ORAL EVERY 6 HOURS
Refills: 0 | Status: DISCONTINUED | OUTPATIENT
Start: 2021-12-31 | End: 2022-01-04

## 2021-12-31 RX ORDER — OXYCODONE AND ACETAMINOPHEN 5; 325 MG/1; MG/1
1 TABLET ORAL EVERY 4 HOURS
Refills: 0 | Status: DISCONTINUED | OUTPATIENT
Start: 2021-12-31 | End: 2022-01-04

## 2021-12-31 RX ORDER — SODIUM CHLORIDE 9 MG/ML
1000 INJECTION INTRAMUSCULAR; INTRAVENOUS; SUBCUTANEOUS
Refills: 0 | Status: DISCONTINUED | OUTPATIENT
Start: 2021-12-31 | End: 2022-01-02

## 2021-12-31 RX ADMIN — TAMSULOSIN HYDROCHLORIDE 0.4 MILLIGRAM(S): 0.4 CAPSULE ORAL at 17:07

## 2021-12-31 RX ADMIN — Medication 100 MILLIGRAM(S): at 22:07

## 2021-12-31 RX ADMIN — OXYCODONE AND ACETAMINOPHEN 2 TABLET(S): 5; 325 TABLET ORAL at 13:01

## 2021-12-31 RX ADMIN — OXYCODONE AND ACETAMINOPHEN 2 TABLET(S): 5; 325 TABLET ORAL at 23:00

## 2021-12-31 RX ADMIN — Medication 650 MILLIGRAM(S): at 11:44

## 2021-12-31 RX ADMIN — Medication 100 MILLIGRAM(S): at 06:43

## 2021-12-31 RX ADMIN — IRON SUCROSE 110 MILLIGRAM(S): 20 INJECTION, SOLUTION INTRAVENOUS at 14:42

## 2021-12-31 RX ADMIN — Medication 650 MILLIGRAM(S): at 11:20

## 2021-12-31 RX ADMIN — SODIUM CHLORIDE 100 MILLILITER(S): 9 INJECTION INTRAMUSCULAR; INTRAVENOUS; SUBCUTANEOUS at 09:57

## 2021-12-31 RX ADMIN — TAMSULOSIN HYDROCHLORIDE 0.4 MILLIGRAM(S): 0.4 CAPSULE ORAL at 06:43

## 2021-12-31 RX ADMIN — OXYCODONE AND ACETAMINOPHEN 2 TABLET(S): 5; 325 TABLET ORAL at 14:01

## 2021-12-31 RX ADMIN — Medication 100 MILLIGRAM(S): at 13:01

## 2021-12-31 RX ADMIN — OXYCODONE AND ACETAMINOPHEN 2 TABLET(S): 5; 325 TABLET ORAL at 22:07

## 2021-12-31 NOTE — PROGRESS NOTE ADULT - SUBJECTIVE AND OBJECTIVE BOX
Patient seen and examined bedside resting comfortably.  No complaints offered.   CBI running, Hoskins draining bloody urine.   Denies fever/chills, chest pain, sob, dizziness. No acute overnight events.     T(F): 97.2 (12-31-21 @ 05:36), Max: 98.3 (12-30-21 @ 11:06)  HR: 64 (12-31-21 @ 05:36) (64 - 90)  BP: 103/64 (12-31-21 @ 05:36) (100/62 - 145/78)  RR: 18 (12-31-21 @ 05:36) (12 - 18)  SpO2: 98% (12-31-21 @ 05:36) (96% - 100%)      PHYSICAL EXAM:    General: NAD, alert and awake  HEENT: NCAT, EOMI, conjunctiva clear  Chest: nonlabored respirations, CTA b/l.  Abdomen: soft, NT/ND.   Extremities: Calf soft, nontender b/l.   : No suprapubic tenderness or bladder distention.  CBI running, 3 way hoskins in place draining bloody urine, no clots.     LABS:                        10.0   6.38  )-----------( 147      ( 31 Dec 2021 08:40 )             29.7   12-30    136  |  103  |  12  ----------------------------<  111<H>  4.0   |  29  |  0.69    Ca    8.1<L>      30 Dec 2021 08:33      I&O's Detail    30 Dec 2021 07:01  -  31 Dec 2021 07:00  --------------------------------------------------------  IN:    Lactated Ringers: 35 mL    PRBCs (Packed Red Blood Cells): 350 mL    sodium chloride 0.45%: 1000 mL  Total IN: 1385 mL    OUT:  Total OUT: 0 mL    Total NET: 1385 mL      Impression: 82 YO M with a sig PMHx of HTN, and prostate cancer 2010 (was an radiation and went into remission). Consult called for urinary retention. Switched to CBI 12/28.   Anemia s/p 2uPRBC  Now POD #1 s/p Cystoscopy with fulguration, clot evacuation and bladder biopsy. HD Stable. H/H stable     Plan:  --Reg diet as tolerated  --Continue CBI, Hoskins - monitor urine output. Titrate CBI to urine output.   --incentive spirometer, DVT ppx, OOB/ambulate as tolerated  --Appreciate medicine co management   --F/u labs, trend H/H  --Will d/w Dr. Taylor

## 2021-12-31 NOTE — CONSULT NOTE ADULT - ASSESSMENT
Assessment and Recommendation:   · Assessment	    82 YO M with a sig PMHx of HTN, and prostate cancer 2010 (was an radiation and went into remission). admitted for for urinary retention. Indwelling Winchester with dark red urine in tubing.    Urinary retention s/p cystoscopy  Monitor CBI, pending biospy results.  H/H: 7.9  acute blood loss anemia  Gross hematuria    HTN now hypotensive (currently stable)   - CW IVFs,   - Hypotension 2/2 to multifactorial --> anemia, pain medications  - Monitor H/H    prostate cancer  -PRBC X 1 unitsr  -start on ivf  -Winchester care  < from: CT Abdomen and Pelvis w/ IV Cont (12.26.21 @ 22:13) >  IMPRESSION:  Winchester catheter within the urinary bladder. Diffuse heterogeneous material   within the urinary bladder likely representing hemorrhage.-Flomax   80 YO M with a sig PMHx of HTN, and prostate cancer 2010 (was an radiation and went into remission). admitted for for urinary retention s/p cystoscopy currently stable.     Urinary retention s/p cystoscopy  Monitor CBI, pending biopsy results.  H/H: 7.9  acute blood loss anemia  Gross hematuria    HTN now hypotensive (currently stable)   - CW IVFs,   - Hypotension 2/2 to multifactorial --> anemia, pain medications  - Monitor H/H    prostate cancer  -PRBC X 1 unitsr  -start on ivf  -Winchester care  < from: CT Abdomen and Pelvis w/ IV Cont (12.26.21 @ 22:13) >  IMPRESSION:  Winchester catheter within the urinary bladder. Diffuse heterogeneous material   within the urinary bladder likely representing hemorrhage.-Flomax   82 YO M with a sig PMHx of HTN, and prostate cancer 2010 (was an radiation and went into remission). admitted for for urinary retention s/p cystoscopy currently stable.     Urinary retention s/p cystoscopy  Monitor CBI, pending biopsy results.  H/H: 7.9  acute blood loss anemia  Gross hematuria    Iron deficiency anemia  Venofer X3. Transition to PO iron after on discharge.    HTN now hypotensive (currently stable)   - CW IVFs,   - Hypotension 2/2 to multifactorial --> anemia, pain medications  - Monitor H/H    prostate cancer  -PRBC X 1 unitsr  -start on ivf  -Winchester care  < from: CT Abdomen and Pelvis w/ IV Cont (12.26.21 @ 22:13) >  IMPRESSION:  Winchester catheter within the urinary bladder. Diffuse heterogeneous material   within the urinary bladder likely representing hemorrhage.-Flomax

## 2022-01-01 LAB
HCT VFR BLD CALC: 27.3 % — LOW (ref 39–50)
HGB BLD-MCNC: 9 G/DL — LOW (ref 13–17)
MCHC RBC-ENTMCNC: 30.2 PG — SIGNIFICANT CHANGE UP (ref 27–34)
MCHC RBC-ENTMCNC: 33 GM/DL — SIGNIFICANT CHANGE UP (ref 32–36)
MCV RBC AUTO: 91.6 FL — SIGNIFICANT CHANGE UP (ref 80–100)
NRBC # BLD: 0 /100 WBCS — SIGNIFICANT CHANGE UP (ref 0–0)
PLATELET # BLD AUTO: 156 K/UL — SIGNIFICANT CHANGE UP (ref 150–400)
RBC # BLD: 2.98 M/UL — LOW (ref 4.2–5.8)
RBC # FLD: 14.4 % — SIGNIFICANT CHANGE UP (ref 10.3–14.5)
WBC # BLD: 7.49 K/UL — SIGNIFICANT CHANGE UP (ref 3.8–10.5)
WBC # FLD AUTO: 7.49 K/UL — SIGNIFICANT CHANGE UP (ref 3.8–10.5)

## 2022-01-01 PROCEDURE — 99233 SBSQ HOSP IP/OBS HIGH 50: CPT

## 2022-01-01 RX ORDER — BENZOCAINE AND MENTHOL 5; 1 G/100ML; G/100ML
1 LIQUID ORAL THREE TIMES A DAY
Refills: 0 | Status: DISCONTINUED | OUTPATIENT
Start: 2022-01-01 | End: 2022-01-04

## 2022-01-01 RX ORDER — BUDESONIDE AND FORMOTEROL FUMARATE DIHYDRATE 160; 4.5 UG/1; UG/1
2 AEROSOL RESPIRATORY (INHALATION)
Refills: 0 | Status: DISCONTINUED | OUTPATIENT
Start: 2022-01-01 | End: 2022-01-04

## 2022-01-01 RX ADMIN — TAMSULOSIN HYDROCHLORIDE 0.4 MILLIGRAM(S): 0.4 CAPSULE ORAL at 05:43

## 2022-01-01 RX ADMIN — OXYCODONE AND ACETAMINOPHEN 2 TABLET(S): 5; 325 TABLET ORAL at 09:19

## 2022-01-01 RX ADMIN — OXYCODONE AND ACETAMINOPHEN 2 TABLET(S): 5; 325 TABLET ORAL at 20:49

## 2022-01-01 RX ADMIN — OXYCODONE AND ACETAMINOPHEN 2 TABLET(S): 5; 325 TABLET ORAL at 14:36

## 2022-01-01 RX ADMIN — Medication 100 MILLIGRAM(S): at 13:17

## 2022-01-01 RX ADMIN — OXYCODONE AND ACETAMINOPHEN 2 TABLET(S): 5; 325 TABLET ORAL at 21:45

## 2022-01-01 RX ADMIN — Medication 100 MILLIGRAM(S): at 05:43

## 2022-01-01 RX ADMIN — Medication 3 MILLIGRAM(S): at 20:49

## 2022-01-01 RX ADMIN — BENZOCAINE AND MENTHOL 1 LOZENGE: 5; 1 LIQUID ORAL at 05:44

## 2022-01-01 RX ADMIN — BUDESONIDE AND FORMOTEROL FUMARATE DIHYDRATE 2 PUFF(S): 160; 4.5 AEROSOL RESPIRATORY (INHALATION) at 22:47

## 2022-01-01 RX ADMIN — OXYCODONE AND ACETAMINOPHEN 2 TABLET(S): 5; 325 TABLET ORAL at 15:36

## 2022-01-01 RX ADMIN — IRON SUCROSE 110 MILLIGRAM(S): 20 INJECTION, SOLUTION INTRAVENOUS at 12:16

## 2022-01-01 RX ADMIN — OXYCODONE AND ACETAMINOPHEN 2 TABLET(S): 5; 325 TABLET ORAL at 08:19

## 2022-01-01 RX ADMIN — TAMSULOSIN HYDROCHLORIDE 0.4 MILLIGRAM(S): 0.4 CAPSULE ORAL at 17:22

## 2022-01-01 RX ADMIN — SODIUM CHLORIDE 100 MILLILITER(S): 9 INJECTION INTRAMUSCULAR; INTRAVENOUS; SUBCUTANEOUS at 05:42

## 2022-01-01 NOTE — CONSULT NOTE ADULT - REASON FOR ADMISSION
Hematuria/Urinary retention

## 2022-01-01 NOTE — CONSULT NOTE ADULT - SUBJECTIVE AND OBJECTIVE BOX
Patient is a 81y old  Male who presents with a chief complaint of Hematuria/Urinary retention (30 Dec 2021 15:32)    INTERVAL HPI/OVERNIGHT EVENTS: Patients seen and examined at bedside this morning. No acute events overnight. Pt doing good overnight. No concerns.     MEDICATIONS  (STANDING):  lactated ringers. 1000 milliLiter(s) (75 mL/Hr) IV Continuous <Continuous>  phenazopyridine 100 milliGRAM(s) Oral every 8 hours  tamsulosin 0.4 milliGRAM(s) Oral every 12 hours    MEDICATIONS  (PRN):  acetaminophen     Tablet .. 650 milliGRAM(s) Oral every 4 hours PRN Mild Pain (1 - 3)  melatonin 3 milliGRAM(s) Oral at bedtime PRN Insomnia  ondansetron Injectable 4 milliGRAM(s) IV Push every 6 hours PRN Nausea    Allergies    No Known Allergies    Intolerances      REVIEW OF SYSTEMS:  All other systems reviewed and are negative    Vital Signs Last 24 Hrs  T(C): 36.2 (31 Dec 2021 05:36), Max: 36.8 (30 Dec 2021 11:06)  T(F): 97.2 (31 Dec 2021 05:36), Max: 98.3 (30 Dec 2021 11:06)  HR: 64 (31 Dec 2021 05:36) (64 - 90)  BP: 103/64 (31 Dec 2021 05:36) (100/62 - 145/78)  BP(mean): --  RR: 18 (31 Dec 2021 05:36) (12 - 18)  SpO2: 98% (31 Dec 2021 05:36) (96% - 100%)  Daily     Daily Weight in k.5 (31 Dec 2021 05:36)  I&O's Summary    30 Dec 2021 07:01  -  31 Dec 2021 07:00  --------------------------------------------------------  IN: 1385 mL / OUT: 0 mL / NET: 1385 mL      CAPILLARY BLOOD GLUCOSE        PHYSICAL EXAM:  GENERAL: NAD, well-groomed, well-developed  HEAD:  Atraumatic, Normocephalic  EYES: EOMI, PERRLA, conjunctiva and sclera clear  ENMT: No tonsillar erythema, exudates, or enlargement; Moist mucous membranes, Good dentition, No lesions  NECK: Supple, No JVD, Normal thyroid  NERVOUS SYSTEM:  Alert & Oriented X3, Good concentration; Motor Strength 5/5 B/L upper and lower extremities; DTRs 2+ intact and symmetric  CHEST/LUNG: Clear to percussion bilaterally; No rales, rhonchi, wheezing, or rubs  HEART: Regular rate and rhythm; No murmurs, rubs, or gallops  ABDOMEN: Soft, Nontender, Nondistended; Bowel sounds present, CBI present   EXTREMITIES:  2+ Peripheral Pulses, No clubbing, cyanosis, or edema  LYMPH: No lymphadenopathy noted  SKIN: No rashes or lesions    Labs                          7.9    6.08  )-----------( 129      ( 30 Dec 2021 08:33 )             23.9     12    136  |  103  |  12  ----------------------------<  111<H>  4.0   |  29  |  0.69    Ca    8.1<L>      30 Dec 2021 08:33                
Patient is a 81y old  Male who presents with a chief complaint of Hematuria/Urinary retention (31 Dec 2021 08:57)    INTERVAL HPI/OVERNIGHT EVENTS: Patients seen and examined at bedside this morning. No acute events overnight. Pt reports he's feeling good. Urine has become less red and now more yellow. Tolerating diet. Denies headache/dizziness/cough/fevers.    MEDICATIONS  (STANDING):  iron sucrose IVPB 200 milliGRAM(s) IV Intermittent every 24 hours  phenazopyridine 100 milliGRAM(s) Oral every 8 hours  sodium chloride 0.9%. 1000 milliLiter(s) (100 mL/Hr) IV Continuous <Continuous>  tamsulosin 0.4 milliGRAM(s) Oral every 12 hours    MEDICATIONS  (PRN):  acetaminophen     Tablet .. 650 milliGRAM(s) Oral every 4 hours PRN Mild Pain (1 - 3)  benzocaine 15 mG/menthol 3.6 mG (Sugar-Free) Lozenge 1 Lozenge Oral three times a day PRN Sore Throat  melatonin 3 milliGRAM(s) Oral at bedtime PRN Insomnia  ondansetron Injectable 4 milliGRAM(s) IV Push every 6 hours PRN Nausea  oxycodone    5 mG/acetaminophen 325 mG 1 Tablet(s) Oral every 4 hours PRN Moderate Pain (4 - 6)  oxycodone    5 mG/acetaminophen 325 mG 2 Tablet(s) Oral every 6 hours PRN Severe Pain (7 - 10)    Allergies    No Known Allergies    Intolerances      REVIEW OF SYSTEMS:  All other systems reviewed and are negative    Vital Signs Last 24 Hrs  T(C): 36.2 (01 Jan 2022 05:18), Max: 36.9 (31 Dec 2021 17:42)  T(F): 97.2 (01 Jan 2022 05:18), Max: 98.4 (31 Dec 2021 17:42)  HR: 91 (01 Jan 2022 08:17) (75 - 91)  BP: 102/64 (01 Jan 2022 08:17) (91/52 - 126/68)  BP(mean): --  RR: 18 (01 Jan 2022 05:18) (16 - 18)  SpO2: 97% (01 Jan 2022 05:18) (95% - 97%)  Daily     Daily   I&O's Summary    31 Dec 2021 07:01  -  01 Jan 2022 07:00  --------------------------------------------------------  IN: 2500 mL / OUT: 0 mL / NET: 2500 mL      CAPILLARY BLOOD GLUCOSE        PHYSICAL EXAM:  GENERAL: NAD, well-groomed, well-developed  HEAD:  Atraumatic, Normocephalic  EYES: EOMI, PERRLA, conjunctiva and sclera clear  ENMT: No tonsillar erythema, exudates, or enlargement; Moist mucous membranes, Good dentition, No lesions  NECK: Supple, No JVD, Normal thyroid  NERVOUS SYSTEM:  Alert & Oriented X3, Good concentration; Motor Strength 5/5 B/L upper and lower extremities; DTRs 2+ intact and symmetric  CHEST/LUNG: Clear to percussion bilaterally; No rales, rhonchi, wheezing, or rubs  HEART: Regular rate and rhythm; No murmurs, rubs, or gallops  ABDOMEN: Soft, Nontender, Nondistended; Bowel sounds present, CBI (yellow urine)  EXTREMITIES:  2+ Peripheral Pulses, No clubbing, cyanosis, or edema  LYMPH: No lymphadenopathy noted  SKIN: No rashes or lesions    Labs                          10.0   6.38  )-----------( 147      ( 31 Dec 2021 08:40 )             29.7     12-31    133<L>  |  99  |  7   ----------------------------<  106<H>  4.1   |  28  |  0.69    Ca    8.0<L>      31 Dec 2021 08:40                          
Patient is a 81y old  Male who presents with a chief complaint of Hematuria/Urinary retention (30 Dec 2021 10:43)    INTERVAL HPI/OVERNIGHT EVENTS: Patients seen and examined at bedside this morning. No acute events overnight. Pt reports no concerns. Pending cystoscopy today.     MEDICATIONS  (STANDING):  chlorhexidine 2% Cloths 1 Application(s) Topical daily  sodium chloride 0.45%. 1000 milliLiter(s) (100 mL/Hr) IV Continuous <Continuous>  tamsulosin 0.4 milliGRAM(s) Oral every 12 hours    MEDICATIONS  (PRN):  acetaminophen     Tablet .. 650 milliGRAM(s) Oral every 4 hours PRN Mild Pain (1 - 3)  melatonin 3 milliGRAM(s) Oral at bedtime PRN Insomnia  oxycodone    5 mG/acetaminophen 325 mG 1 Tablet(s) Oral every 4 hours PRN Moderate Pain (4 - 6)  oxycodone    5 mG/acetaminophen 325 mG 2 Tablet(s) Oral every 6 hours PRN Severe Pain (7 - 10)    Allergies    No Known Allergies    Intolerances      REVIEW OF SYSTEMS:  All other systems reviewed and are negative    Vital Signs Last 24 Hrs  T(C): 36.8 (30 Dec 2021 12:10), Max: 37.3 (30 Dec 2021 00:37)  T(F): 98.2 (30 Dec 2021 12:10), Max: 99.1 (30 Dec 2021 00:37)  HR: 88 (30 Dec 2021 12:10) (87 - 103)  BP: 108/67 (30 Dec 2021 12:10) (85/54 - 108/67)  BP(mean): --  RR: 16 (30 Dec 2021 12:10) (16 - 18)  SpO2: 98% (30 Dec 2021 12:10) (97% - 100%)  Daily     Daily   I&O's Summary    29 Dec 2021 07:01  -  30 Dec 2021 07:00  --------------------------------------------------------  IN: 0 mL / OUT: 5000 mL / NET: -5000 mL      CAPILLARY BLOOD GLUCOSE        PHYSICAL EXAM:  GENERAL: NAD, well-groomed, well-developed  HEAD:  Atraumatic, Normocephalic  EYES: EOMI, PERRLA, conjunctiva and sclera clear  ENMT: No tonsillar erythema, exudates, or enlargement; Moist mucous membranes, Good dentition, No lesions  NECK: Supple, No JVD, Normal thyroid  NERVOUS SYSTEM:  Alert & Oriented X3, Good concentration; Motor Strength 5/5 B/L upper and lower extremities; DTRs 2+ intact and symmetric  CHEST/LUNG: Clear to percussion bilaterally; No rales, rhonchi, wheezing, or rubs  HEART: Regular rate and rhythm; No murmurs, rubs, or gallops  ABDOMEN: Soft, Nontender, Nondistended; Bowel sounds present, CBI running with clotted urine on return   EXTREMITIES:  2+ Peripheral Pulses, No clubbing, cyanosis, or edema  LYMPH: No lymphadenopathy noted  SKIN: No rashes or lesions    Labs                          7.9    6.08  )-----------( 129      ( 30 Dec 2021 08:33 )             23.9     12-30    136  |  103  |  12  ----------------------------<  111<H>  4.0   |  29  |  0.69    Ca    8.1<L>      30 Dec 2021 08:33        
HPI:  80 YO M with a sig PMHx of HTN, and prostate cancer 2010 (was an radiation and went into remission). Patient states that he has not been able to urinate since yesterday, causing him severe 10/10 abdominal pain. He is unable to sit up, and only laying down makes him feel better. Denies D/C/N/V, fever, chills, chest pain or shortness of breath. (26 Dec 2021 18:55)      PAST MEDICAL & SURGICAL HISTORY:  HTN (hypertension)    Prostate cancer    H/O left inguinal hernia repair        MEDICATIONS  (STANDING):  chlorhexidine 2% Cloths 1 Application(s) Topical daily  sodium chloride 0.45%. 1000 milliLiter(s) (100 mL/Hr) IV Continuous <Continuous>  tamsulosin 0.4 milliGRAM(s) Oral every 12 hours    MEDICATIONS  (PRN):  acetaminophen     Tablet .. 650 milliGRAM(s) Oral every 4 hours PRN Mild Pain (1 - 3)  melatonin 3 milliGRAM(s) Oral at bedtime PRN Insomnia  oxycodone    5 mG/acetaminophen 325 mG 1 Tablet(s) Oral every 4 hours PRN Moderate Pain (4 - 6)  oxycodone    5 mG/acetaminophen 325 mG 2 Tablet(s) Oral every 6 hours PRN Severe Pain (7 - 10)      Allergies    No Known Allergies    Intolerances        SOCIAL HISTORY:    FAMILY HISTORY:      REVIEW OF SYSTEMS:     CONSTITUTIONAL: No fever, weight loss, or fatigue  EYES: No eye pain, visual disturbances, or discharge  ENMT:  No difficulty hearing, tinnitus, vertigo; No sinus or throat pain  NECK: No pain or stiffness  BREASTS: No pain, masses, or nipple discharge  RESPIRATORY: No cough, wheezing, chills or hemoptysis; No shortness of breath  CARDIOVASCULAR: No chest pain, palpitations, dizziness, or leg swelling  GASTROINTESTINAL: No abdominal or epigastric pain. No nausea, vomiting, or hematemesis; No diarrhea or constipation. No melena or hematochezia.  GENITOURINARY: No dysuria, frequency, hematuria, or incontinence  NEUROLOGICAL: No headaches, memory loss, loss of strength, numbness, or tremors  SKIN: No itching, burning, rashes, or lesions   LYMPH NODES: No enlarged glands  ENDOCRINE: No heat or cold intolerance; No hair loss  MUSCULOSKELETAL: No joint pain or swelling; No muscle, back, or extremity pain  PSYCHIATRIC: No depression, anxiety, mood swings, or difficulty sleeping  HEME/LYMPH: No easy bruising, or bleeding gums  ALLERY AND IMMUNOLOGIC: No hives or eczema    T(F): 98.1 (12-29-21 @ 16:38), Max: 98.1 (12-29-21 @ 16:38)  HR: 101 (12-29-21 @ 16:38) (100 - 116)  BP: 92/60 (12-29-21 @ 16:38) (76/47 - 102/65)  RR: 18 (12-29-21 @ 16:38) (17 - 18)  SpO2: 98% (12-29-21 @ 16:38) (97% - 98%)  Wt(kg): --    PHYSICAL EXAM:    GENERAL: NAD, well-groomed, well-developed  HEAD:  Atraumatic, Normocephalic  EYES: EOMI, PERRLA, conjunctiva and sclera clear  ENMT: No tonsillar erythema, exudates, or enlargement; Moist mucous membranes  NECK: Supple, No JVD, Normal thyroid  NERVOUS SYSTEM:  Alert & Oriented X3, Good concentration; Motor Strength 5/5 B/L upper and lower extremities; DTRs 2+ intact and symmetric  CHEST/LUNG: Clear to percussion bilaterally; No rales, rhonchi, wheezing, or rubs BL  HEART: Regular rate and rhythm; No murmurs, rubs, or gallops  ABDOMEN: Soft, Nontender, Nondistended; Bowel sounds present  EXTREMITIES:  2+ Peripheral Pulses, No clubbing, cyanosis, or edema  SKIN: No rashes or lesions      LABS:                        8.4    9.89  )-----------( 131      ( 29 Dec 2021 06:29 )             24.8     12-29    140  |  105  |  21  ----------------------------<  118<H>  4.0   |  29  |  0.90    Ca    8.2<L>      29 Dec 2021 06:29      Lipid panel-  LDL;   TG;       Cultures;  I&O's Summary      RADIOLOGY & ADDITIONAL STUDIES:    CT abd- Winchester catheter within the urinary bladder. Diffuse heterogeneous material within the urinary bladder likely representing hemorrhage.

## 2022-01-01 NOTE — CONSULT NOTE ADULT - ASSESSMENT
80 YO M with a sig PMHx of HTN, and prostate cancer 2010 (was an radiation and went into remission). admitted for for urinary retention s/p cystoscopy currently stable.     Urinary retention s/p cystoscopy  Monitor CBI, as per urologist  Biopsy shows negative for urothelial carcinoma  Monitor H/H  acute blood loss anemia  Gross hematuria    Iron deficiency anemia  Venofer X3. Transition to PO iron after on discharge.    HTN  (currently stable)   - CW IVFs,   - Hypotension 2/2 to multifactorial --> anemia  - Monitor H/H    prostate cancer  -PRBC X 1 unitsr  -start on ivf  -Winchester care  < from: CT Abdomen and Pelvis w/ IV Cont (12.26.21 @ 22:13) >  IMPRESSION:  Winchester catheter within the urinary bladder. Diffuse heterogeneous material   within the urinary bladder likely representing hemorrhage.-Flomax

## 2022-01-02 LAB
ALBUMIN SERPL ELPH-MCNC: 2.1 G/DL — LOW (ref 3.3–5)
ALP SERPL-CCNC: 43 U/L — SIGNIFICANT CHANGE UP (ref 40–120)
ALT FLD-CCNC: 12 U/L — SIGNIFICANT CHANGE UP (ref 12–78)
ANION GAP SERPL CALC-SCNC: 4 MMOL/L — LOW (ref 5–17)
AST SERPL-CCNC: 12 U/L — LOW (ref 15–37)
BILIRUB SERPL-MCNC: 0.2 MG/DL — SIGNIFICANT CHANGE UP (ref 0.2–1.2)
BUN SERPL-MCNC: 8 MG/DL — SIGNIFICANT CHANGE UP (ref 7–23)
CALCIUM SERPL-MCNC: 8.5 MG/DL — SIGNIFICANT CHANGE UP (ref 8.5–10.1)
CHLORIDE SERPL-SCNC: 104 MMOL/L — SIGNIFICANT CHANGE UP (ref 96–108)
CO2 SERPL-SCNC: 32 MMOL/L — HIGH (ref 22–31)
CREAT SERPL-MCNC: 0.62 MG/DL — SIGNIFICANT CHANGE UP (ref 0.5–1.3)
GLUCOSE SERPL-MCNC: 104 MG/DL — HIGH (ref 70–99)
HCT VFR BLD CALC: 27.3 % — LOW (ref 39–50)
HGB BLD-MCNC: 9 G/DL — LOW (ref 13–17)
MCHC RBC-ENTMCNC: 30.2 PG — SIGNIFICANT CHANGE UP (ref 27–34)
MCHC RBC-ENTMCNC: 33 GM/DL — SIGNIFICANT CHANGE UP (ref 32–36)
MCV RBC AUTO: 91.6 FL — SIGNIFICANT CHANGE UP (ref 80–100)
NRBC # BLD: 0 /100 WBCS — SIGNIFICANT CHANGE UP (ref 0–0)
PLATELET # BLD AUTO: 162 K/UL — SIGNIFICANT CHANGE UP (ref 150–400)
POTASSIUM SERPL-MCNC: 3.9 MMOL/L — SIGNIFICANT CHANGE UP (ref 3.5–5.3)
POTASSIUM SERPL-SCNC: 3.9 MMOL/L — SIGNIFICANT CHANGE UP (ref 3.5–5.3)
PROT SERPL-MCNC: 5.7 GM/DL — LOW (ref 6–8.3)
RAPID RVP RESULT: DETECTED
RBC # BLD: 2.98 M/UL — LOW (ref 4.2–5.8)
RBC # FLD: 14.3 % — SIGNIFICANT CHANGE UP (ref 10.3–14.5)
SARS-COV-2 RNA SPEC QL NAA+PROBE: DETECTED
SODIUM SERPL-SCNC: 140 MMOL/L — SIGNIFICANT CHANGE UP (ref 135–145)
WBC # BLD: 7.07 K/UL — SIGNIFICANT CHANGE UP (ref 3.8–10.5)
WBC # FLD AUTO: 7.07 K/UL — SIGNIFICANT CHANGE UP (ref 3.8–10.5)

## 2022-01-02 PROCEDURE — 99233 SBSQ HOSP IP/OBS HIGH 50: CPT

## 2022-01-02 RX ADMIN — Medication 650 MILLIGRAM(S): at 13:38

## 2022-01-02 RX ADMIN — OXYCODONE AND ACETAMINOPHEN 2 TABLET(S): 5; 325 TABLET ORAL at 04:30

## 2022-01-02 RX ADMIN — BUDESONIDE AND FORMOTEROL FUMARATE DIHYDRATE 2 PUFF(S): 160; 4.5 AEROSOL RESPIRATORY (INHALATION) at 17:39

## 2022-01-02 RX ADMIN — BUDESONIDE AND FORMOTEROL FUMARATE DIHYDRATE 2 PUFF(S): 160; 4.5 AEROSOL RESPIRATORY (INHALATION) at 05:13

## 2022-01-02 RX ADMIN — OXYCODONE AND ACETAMINOPHEN 2 TABLET(S): 5; 325 TABLET ORAL at 05:14

## 2022-01-02 RX ADMIN — TAMSULOSIN HYDROCHLORIDE 0.4 MILLIGRAM(S): 0.4 CAPSULE ORAL at 17:39

## 2022-01-02 RX ADMIN — IRON SUCROSE 110 MILLIGRAM(S): 20 INJECTION, SOLUTION INTRAVENOUS at 11:46

## 2022-01-02 RX ADMIN — TAMSULOSIN HYDROCHLORIDE 0.4 MILLIGRAM(S): 0.4 CAPSULE ORAL at 05:12

## 2022-01-02 RX ADMIN — Medication 650 MILLIGRAM(S): at 15:03

## 2022-01-02 NOTE — PROGRESS NOTE ADULT - ASSESSMENT
80 YO M with a sig PMHx of HTN, and prostate cancer 2010 (was an radiation and went into remission). Consult called for urinary retention. Switched to CBI 12/28.   Anemia s/p 2uPRBC  Now POD #3 s/p Cystoscopy with fulguration, clot evacuation and bladder biopsy. HD Stable. H/H stable     Plan:  --Reg diet as tolerated  --Discontinue CBI, Winchester - monitor urine output  --incentive spirometer, DVT ppx, OOB/ambulate as tolerated  --Appreciate medicine co management   --F/u labs, trend H/H  --Will d/w Dr. Taylor

## 2022-01-02 NOTE — PROGRESS NOTE ADULT - SUBJECTIVE AND OBJECTIVE BOX
Patient is a 81y old  Male who presents with a chief complaint of Hematuria/Urinary retention (02 Jan 2022 14:27)    INTERVAL HPI/OVERNIGHT EVENTS: Patients seen and examined at bedside this morning. No acute events overnight. Pt reports no concerns. Tolerating diet. Denies headache, chills, fevers, or SOB.    MEDICATIONS  (STANDING):  budesonide 160 MICROgram(s)/formoterol 4.5 MICROgram(s) Inhaler 2 Puff(s) Inhalation two times a day  tamsulosin 0.4 milliGRAM(s) Oral every 12 hours    MEDICATIONS  (PRN):  acetaminophen     Tablet .. 650 milliGRAM(s) Oral every 4 hours PRN Mild Pain (1 - 3)  benzocaine 15 mG/menthol 3.6 mG (Sugar-Free) Lozenge 1 Lozenge Oral three times a day PRN Sore Throat  melatonin 3 milliGRAM(s) Oral at bedtime PRN Insomnia  ondansetron Injectable 4 milliGRAM(s) IV Push every 6 hours PRN Nausea  oxycodone    5 mG/acetaminophen 325 mG 1 Tablet(s) Oral every 4 hours PRN Moderate Pain (4 - 6)  oxycodone    5 mG/acetaminophen 325 mG 2 Tablet(s) Oral every 6 hours PRN Severe Pain (7 - 10)    Allergies    No Known Allergies    Intolerances      REVIEW OF SYSTEMS:  All other systems reviewed and are negative    Vital Signs Last 24 Hrs  T(C): 37.9 (02 Jan 2022 10:40), Max: 37.9 (02 Jan 2022 10:40)  T(F): 100.3 (02 Jan 2022 10:40), Max: 100.3 (02 Jan 2022 10:40)  HR: 91 (02 Jan 2022 10:40) (74 - 98)  BP: 102/66 (02 Jan 2022 10:40) (95/59 - 107/68)  BP(mean): --  RR: 18 (02 Jan 2022 10:40) (18 - 18)  SpO2: 94% (02 Jan 2022 10:40) (93% - 98%)  Daily     Daily   I&O's Summary    01 Jan 2022 07:01  -  02 Jan 2022 07:00  --------------------------------------------------------  IN: 1700 mL / OUT: 400 mL / NET: 1300 mL      CAPILLARY BLOOD GLUCOSE        PHYSICAL EXAM:  GENERAL: NAD, well-groomed, well-developed  HEAD:  Atraumatic, Normocephalic  EYES: EOMI, PERRLA, conjunctiva and sclera clear  ENMT: No tonsillar erythema, exudates, or enlargement; Moist mucous membranes, Good dentition, No lesions  NECK: Supple, No JVD, Normal thyroid  NERVOUS SYSTEM:  Alert & Oriented X3, Good concentration; Motor Strength 5/5 B/L upper and lower extremities; DTRs 2+ intact and symmetric  CHEST/LUNG: Clear to percussion bilaterally; No rales, rhonchi, wheezing, or rubs  HEART: Regular rate and rhythm; No murmurs, rubs, or gallops  ABDOMEN: Soft, Nontender, Nondistended; Bowel sounds present, CBI with yellow urine  EXTREMITIES:  2+ Peripheral Pulses, No clubbing, cyanosis, or edema  LYMPH: No lymphadenopathy noted  SKIN: No rashes or lesions    Labs                          9.0    7.07  )-----------( 162      ( 02 Jan 2022 09:16 )             27.3     01-02    140  |  104  |  8   ----------------------------<  104<H>  3.9   |  32<H>  |  0.62    Ca    8.5      02 Jan 2022 09:16    TPro  5.7<L>  /  Alb  2.1<L>  /  TBili  0.2  /  DBili  x   /  AST  12<L>  /  ALT  12  /  AlkPhos  43  01-02

## 2022-01-02 NOTE — PROGRESS NOTE ADULT - ASSESSMENT
80 YO M with a sig PMHx of HTN, and prostate cancer 2010 (was an radiation and went into remission). Consult called for urinary retention. Switched to CBI 12/28.   Anemia s/p 2uPRBC  Now POD #3 s/p Cystoscopy with fulguration, clot evacuation and bladder biopsy. HD Stable. H/H stable     Urinary retention s/p cystoscopy  Monitor CBI, as per urologist  Biopsy shows negative for urothelial carcinoma  Monitor H/H  acute blood loss anemia  Gross hematuria    Iron deficiency anemia  Venofer X3. Transition to PO iron after on discharge.    HTN  (currently stable)   - CW IVFs,   - Hypotension 2/2 to multifactorial --> anemia    prostate cancer  < from: CT Abdomen and Pelvis w/ IV Cont (12.26.21 @ 22:13) >  IMPRESSION:  Winchester catheter within the urinary bladder. Diffuse heterogeneous material   within the urinary bladder likely representing hemorrhage.  -Flomax       80 YO M with a sig PMHx of HTN, and prostate cancer 2010 (was an radiation and went into remission). Consult called for urinary retention. Switched to CBI 12/28.   Anemia s/p 2uPRBC  Now POD #3 s/p Cystoscopy with fulguration, clot evacuation and bladder biopsy. HD Stable. H/H stable     New fever  Positive exposure to prior roommate  Will retest for COVID    Urinary retention s/p cystoscopy  Monitor CBI, as per urologist  Biopsy shows negative for urothelial carcinoma  Monitor H/H  acute blood loss anemia  Gross hematuria    Iron deficiency anemia  Venofer X3. Transition to PO iron after on discharge.    HTN  (currently stable)   - CW IVFs,   - Hypotension 2/2 to multifactorial --> anemia    prostate cancer  < from: CT Abdomen and Pelvis w/ IV Cont (12.26.21 @ 22:13) >  IMPRESSION:  Winchester catheter within the urinary bladder. Diffuse heterogeneous material   within the urinary bladder likely representing hemorrhage.  -Flomax

## 2022-01-02 NOTE — PROGRESS NOTE ADULT - SUBJECTIVE AND OBJECTIVE BOX
Patient seen and examined bedside resting comfortably.  No complaints offered.   CBI running, Hoskins draining clear yellow urine   Denies fever/chills, chest pain, sob, dizziness. No acute overnight events.       MEDICATIONS  (STANDING):  budesonide 160 MICROgram(s)/formoterol 4.5 MICROgram(s) Inhaler 2 Puff(s) Inhalation two times a day  iron sucrose IVPB 200 milliGRAM(s) IV Intermittent every 24 hours  tamsulosin 0.4 milliGRAM(s) Oral every 12 hours    MEDICATIONS  (PRN):  acetaminophen     Tablet .. 650 milliGRAM(s) Oral every 4 hours PRN Mild Pain (1 - 3)  benzocaine 15 mG/menthol 3.6 mG (Sugar-Free) Lozenge 1 Lozenge Oral three times a day PRN Sore Throat  melatonin 3 milliGRAM(s) Oral at bedtime PRN Insomnia  ondansetron Injectable 4 milliGRAM(s) IV Push every 6 hours PRN Nausea  oxycodone    5 mG/acetaminophen 325 mG 1 Tablet(s) Oral every 4 hours PRN Moderate Pain (4 - 6)  oxycodone    5 mG/acetaminophen 325 mG 2 Tablet(s) Oral every 6 hours PRN Severe Pain (7 - 10)      Vital Signs Last 24 Hrs  T(C): 36.6 (02 Jan 2022 00:29), Max: 37.6 (01 Jan 2022 12:38)  T(F): 97.9 (02 Jan 2022 00:29), Max: 99.7 (01 Jan 2022 12:38)  HR: 79 (02 Jan 2022 00:29) (75 - 98)  BP: 100/61 (02 Jan 2022 00:29) (100/61 - 113/70)  RR: 18 (02 Jan 2022 00:29) (18 - 18)  SpO2: 97% (02 Jan 2022 00:29) (93% - 97%)    PHYSICAL EXAM:    General: NAD, alert and awake  HEENT: NCAT, EOMI, conjunctiva clear  Chest: nonlabored respirations, CTA b/l.  Abdomen: soft, NT/ND.   Extremities: Calf soft, nontender b/l.   : No suprapubic tenderness or bladder distention.  CBI running, 3 way hoskins in place draining clear yellow urine, no blood or clots noted.            LABS:                        9.0    7.49  )-----------( 156      ( 01 Jan 2022 17:58 )             27.3     12-31    133<L>  |  99  |  7   ----------------------------<  106<H>  4.1   |  28  |  0.69    Ca    8.0<L>      31 Dec 2021 08:40

## 2022-01-03 LAB
HCT VFR BLD CALC: 29.7 % — LOW (ref 39–50)
HGB BLD-MCNC: 9.9 G/DL — LOW (ref 13–17)
MCHC RBC-ENTMCNC: 30.7 PG — SIGNIFICANT CHANGE UP (ref 27–34)
MCHC RBC-ENTMCNC: 33.3 GM/DL — SIGNIFICANT CHANGE UP (ref 32–36)
MCV RBC AUTO: 92 FL — SIGNIFICANT CHANGE UP (ref 80–100)
NRBC # BLD: 0 /100 WBCS — SIGNIFICANT CHANGE UP (ref 0–0)
PLATELET # BLD AUTO: 210 K/UL — SIGNIFICANT CHANGE UP (ref 150–400)
RBC # BLD: 3.23 M/UL — LOW (ref 4.2–5.8)
RBC # FLD: 14.3 % — SIGNIFICANT CHANGE UP (ref 10.3–14.5)
WBC # BLD: 7.23 K/UL — SIGNIFICANT CHANGE UP (ref 3.8–10.5)
WBC # FLD AUTO: 7.23 K/UL — SIGNIFICANT CHANGE UP (ref 3.8–10.5)

## 2022-01-03 PROCEDURE — 99233 SBSQ HOSP IP/OBS HIGH 50: CPT

## 2022-01-03 RX ADMIN — Medication 650 MILLIGRAM(S): at 07:44

## 2022-01-03 RX ADMIN — BUDESONIDE AND FORMOTEROL FUMARATE DIHYDRATE 2 PUFF(S): 160; 4.5 AEROSOL RESPIRATORY (INHALATION) at 18:07

## 2022-01-03 RX ADMIN — Medication 650 MILLIGRAM(S): at 00:11

## 2022-01-03 RX ADMIN — OXYCODONE AND ACETAMINOPHEN 2 TABLET(S): 5; 325 TABLET ORAL at 12:51

## 2022-01-03 RX ADMIN — TAMSULOSIN HYDROCHLORIDE 0.4 MILLIGRAM(S): 0.4 CAPSULE ORAL at 18:01

## 2022-01-03 RX ADMIN — TAMSULOSIN HYDROCHLORIDE 0.4 MILLIGRAM(S): 0.4 CAPSULE ORAL at 06:38

## 2022-01-03 RX ADMIN — OXYCODONE AND ACETAMINOPHEN 2 TABLET(S): 5; 325 TABLET ORAL at 11:51

## 2022-01-03 NOTE — DISCHARGE NOTE PROVIDER - NSDCMRMEDTOKEN_GEN_ALL_CORE_FT
enalapril 5 mg oral tablet: 1 tab(s) orally once a day  Flomax 0.4 mg oral capsule: 1 tab(s) orally 2 times a day

## 2022-01-03 NOTE — PROGRESS NOTE ADULT - SUBJECTIVE AND OBJECTIVE BOX
Patient is a 81y old  Male who presents with a chief complaint of Hematuria/Urinary retention (02 Jan 2022 14:27)    INTERVAL HPI/OVERNIGHT EVENTS: Patients seen and examined at bedside this morning. No acute events overnight. Pt reports no concerns. Tolerating diet. Denies headache, chills, fevers, or SOB.    MEDICATIONS  (STANDING):  budesonide 160 MICROgram(s)/formoterol 4.5 MICROgram(s) Inhaler 2 Puff(s) Inhalation two times a day  tamsulosin 0.4 milliGRAM(s) Oral every 12 hours    MEDICATIONS  (PRN):  acetaminophen     Tablet .. 650 milliGRAM(s) Oral every 4 hours PRN Mild Pain (1 - 3)  benzocaine 15 mG/menthol 3.6 mG (Sugar-Free) Lozenge 1 Lozenge Oral three times a day PRN Sore Throat  melatonin 3 milliGRAM(s) Oral at bedtime PRN Insomnia  ondansetron Injectable 4 milliGRAM(s) IV Push every 6 hours PRN Nausea  oxycodone    5 mG/acetaminophen 325 mG 1 Tablet(s) Oral every 4 hours PRN Moderate Pain (4 - 6)  oxycodone    5 mG/acetaminophen 325 mG 2 Tablet(s) Oral every 6 hours PRN Severe Pain (7 - 10)    Allergies    No Known Allergies    Intolerances      REVIEW OF SYSTEMS:  All other systems reviewed and are negative    Vital Signs Last 24 Hrs  T(C): 37 (03 Jan 2022 05:46), Max: 37.9 (02 Jan 2022 10:40)  T(F): 98.6 (03 Jan 2022 05:46), Max: 100.3 (02 Jan 2022 10:40)  HR: 82 (03 Jan 2022 05:46) (81 - 96)  BP: 97/64 (03 Jan 2022 05:46) (91/55 - 107/67)  BP(mean): --  RR: 18 (03 Jan 2022 05:46) (18 - 18)  SpO2: 98% (03 Jan 2022 05:46) (94% - 98%)      CAPILLARY BLOOD GLUCOSE      PHYSICAL EXAM:  GENERAL: NAD, well-groomed, well-developed  HEAD:  Atraumatic, Normocephalic  EYES: EOMI, PERRLA, conjunctiva and sclera clear  ENMT: No tonsillar erythema, exudates, or enlargement; Moist mucous membranes, Good dentition, No lesions  NECK: Supple, No JVD, Normal thyroid  NERVOUS SYSTEM:  Alert & Oriented X3, Good concentration; Motor Strength 5/5 B/L upper and lower extremities; DTRs 2+ intact and symmetric  CHEST/LUNG: Clear to percussion bilaterally; No rales, rhonchi, wheezing, or rubs  HEART: Regular rate and rhythm; No murmurs, rubs, or gallops  ABDOMEN: Soft, Nontender, Nondistended; Bowel sounds present, CBI with yellow urine  EXTREMITIES:  2+ Peripheral Pulses, No clubbing, cyanosis, or edema  LYMPH: No lymphadenopathy noted  SKIN: No rashes or lesions    Labs                                              9.0    7.07  )-----------( 162      ( 02 Jan 2022 09:16 )             27.3     01-02    140  |  104  |  8   ----------------------------<  104<H>  3.9   |  32<H>  |  0.62    Ca    8.5      02 Jan 2022 09:16    TPro  5.7<L>  /  Alb  2.1<L>  /  TBili  0.2  /  DBili  x   /  AST  12<L>  /  ALT  12  /  AlkPhos  43  01-02                               Patient is a 81y old  Male who presents with a chief complaint of Hematuria/Urinary retention (02 Jan 2022 14:27)    INTERVAL HPI/OVERNIGHT EVENTS: Patients doing well this morning, wants to go home,   Low grade fevers, hypotensive     MEDICATIONS  (STANDING):  budesonide 160 MICROgram(s)/formoterol 4.5 MICROgram(s) Inhaler 2 Puff(s) Inhalation two times a day  tamsulosin 0.4 milliGRAM(s) Oral every 12 hours    MEDICATIONS  (PRN):  acetaminophen     Tablet .. 650 milliGRAM(s) Oral every 4 hours PRN Mild Pain (1 - 3)  benzocaine 15 mG/menthol 3.6 mG (Sugar-Free) Lozenge 1 Lozenge Oral three times a day PRN Sore Throat  melatonin 3 milliGRAM(s) Oral at bedtime PRN Insomnia  ondansetron Injectable 4 milliGRAM(s) IV Push every 6 hours PRN Nausea  oxycodone    5 mG/acetaminophen 325 mG 1 Tablet(s) Oral every 4 hours PRN Moderate Pain (4 - 6)  oxycodone    5 mG/acetaminophen 325 mG 2 Tablet(s) Oral every 6 hours PRN Severe Pain (7 - 10)    Allergies    No Known Allergies    Intolerances      REVIEW OF SYSTEMS:  All other systems reviewed and are negative    Vital Signs Last 24 Hrs  ICU Vital Signs Last 24 Hrs  T(C): 37.9 (03 Jan 2022 11:53), Max: 37.9 (03 Jan 2022 01:25)  T(F): 100.2 (03 Jan 2022 11:53), Max: 100.2 (03 Jan 2022 01:25)  HR: 96 (03 Jan 2022 11:53) (81 - 96)  BP: 92/58 (03 Jan 2022 11:53) (91/55 - 107/67)  BP(mean): --  ABP: --  ABP(mean): --  RR: 17 (03 Jan 2022 11:53) (17 - 18)  SpO2: 96% (03 Jan 2022 11:53) (95% - 98%)    PHYSICAL EXAM:  GENERAL: NAD, well-groomed, well-developed  HEAD:  Atraumatic, Normocephalic  EYES: EOMI, PERRLA, conjunctiva and sclera clear  ENMT: No tonsillar erythema, exudates, or enlargement; Moist mucous membranes, Good dentition, No lesions  NECK: Supple, No JVD, Normal thyroid  NERVOUS SYSTEM:  Alert & Oriented X3, Good concentration; Motor Strength 5/5 B/L upper and lower extremities; DTRs 2+ intact and symmetric  CHEST/LUNG: Clear to percussion bilaterally; No rales, rhonchi, wheezing, or rubs  HEART: Regular rate and rhythm; No murmurs, rubs, or gallops  ABDOMEN: Soft, Nontender, Nondistended; Bowel sounds present, hoskins present -urine yellow, some tinge of red blood   EXTREMITIES:  2+ Peripheral Pulses, No clubbing, cyanosis, or edema  LYMPH: No lymphadenopathy noted  SKIN: No rashes or lesions    Labs                                                                  9.9    7.23  )-----------( 210      ( 03 Jan 2022 11:15 )             29.7     01-02    140  |  104  |  8   ----------------------------<  104<H>  3.9   |  32<H>  |  0.62    Ca    8.5      02 Jan 2022 09:16    TPro  5.7<L>  /  Alb  2.1<L>  /  TBili  0.2  /  DBili  x   /  AST  12<L>  /  ALT  12  /  AlkPhos  43  01-02

## 2022-01-03 NOTE — CHART NOTE - NSCHARTNOTEFT_GEN_A_CORE
Assessment: Pt admitted for hematuria/urinary retention; s/p cystoscopy 12/30. Pt also with iron deficiency anemia s/p 2uPRBC, and new fever (tested + for COVID 01/02).  PMHx of HTN, and prostate cancer 2010 (was on radiation and went into remission).    Factors impacting intake: [ ] none [ ] nausea  [ ] vomiting [ ] diarrhea [ ] constipation  [ ]chewing problems [ ] swallowing issues  [x] other: persistent lack of appetite    Diet Prescription: Diet, Regular:   DASH/TLC {Sodium & Cholesterol Restricted} (12-30-21 @ 19:34)    Intake: <50%    Current Weight: 54.5 kg (12/31), 54.4 kg (12/26)  % Weight Change Wt stable x 5 days; request current wt    Fluid accumulation: No noted edema as per flowsheets    Pertinent Medications: MEDICATIONS  (STANDING):  budesonide 160 MICROgram(s)/formoterol 4.5 MICROgram(s) Inhaler 2 Puff(s) Inhalation two times a day  tamsulosin 0.4 milliGRAM(s) Oral every 12 hours    MEDICATIONS  (PRN):  acetaminophen     Tablet .. 650 milliGRAM(s) Oral every 4 hours PRN Mild Pain (1 - 3)  benzocaine 15 mG/menthol 3.6 mG (Sugar-Free) Lozenge 1 Lozenge Oral three times a day PRN Sore Throat  melatonin 3 milliGRAM(s) Oral at bedtime PRN Insomnia  ondansetron Injectable 4 milliGRAM(s) IV Push every 6 hours PRN Nausea  oxycodone    5 mG/acetaminophen 325 mG 1 Tablet(s) Oral every 4 hours PRN Moderate Pain (4 - 6)  oxycodone    5 mG/acetaminophen 325 mG 2 Tablet(s) Oral every 6 hours PRN Severe Pain (7 - 10)    Pertinent Labs: 01-02 Na140 mmol/L Glu 104 mg/dL<H> K+ 3.9 mmol/L Cr  0.62 mg/dL BUN 8 mg/dL 01-02 Alb 2.1 g/dL<L>    Skin: WDL    Estimated Needs:   [x] no change since previous assessment on 12/29/21  [ ] recalculated:     Previous Nutrition Diagnosis:   Nutrition Diagnostic Terminology #1 Malnutrition...     Malnutrition Severe malnutrition in the context of chronic illness.     Etiology Inadequate protein-energy intake in setting of history of prostate CA.     Signs/Symptoms Physical findings of severe muscle wasting and fat loss as noted on 12/29 nutrition note    Goal/Expected Outcome Pt to consume >50% meals/supplements during LOS - goal not met    Nutrition Diagnosis is [x] ongoing  [ ] resolved [ ] not applicable     New Nutrition Diagnosis: [x] not applicable       Interventions:   Recommend  [ ] Change Diet To:  [x] Nutrition Supplement: Increase Vital Cuisine Health Shake to 2x/day (1040 kcal & 44 gm protein)  [ ] Nutrition Support  [ ] Other:     Monitoring and Evaluation:   [ x ] PO intake [ x ] Tolerance to diet prescription [ x ] weights [ x ] labs[ x ] follow up per protocol  [ ] other: Assessment: Pt admitted for hematuria/urinary retention; s/p cystoscopy 12/30, biopsy shows negative for urothelial carcinoma. Pt also with iron deficiency anemia s/p 2uPRBC, and new fever (tested + for COVID 01/02).  PMHx of HTN, and prostate cancer 2010 (was on radiation and went into remission).    Factors impacting intake: [ ] none [ ] nausea  [ ] vomiting [ ] diarrhea [ ] constipation  [ ]chewing problems [ ] swallowing issues  [x] other: persistent lack of appetite    Diet Prescription: Diet, Regular:   DASH/TLC {Sodium & Cholesterol Restricted} (12-30-21 @ 19:34)    Intake: <50%    Current Weight: 54.5 kg (12/31), 54.4 kg (12/26)  % Weight Change Wt stable x 5 days; request current wt    Fluid accumulation: No noted edema as per flowsheets    Pertinent Medications: MEDICATIONS  (STANDING):  budesonide 160 MICROgram(s)/formoterol 4.5 MICROgram(s) Inhaler 2 Puff(s) Inhalation two times a day  tamsulosin 0.4 milliGRAM(s) Oral every 12 hours    MEDICATIONS  (PRN):  acetaminophen     Tablet .. 650 milliGRAM(s) Oral every 4 hours PRN Mild Pain (1 - 3)  benzocaine 15 mG/menthol 3.6 mG (Sugar-Free) Lozenge 1 Lozenge Oral three times a day PRN Sore Throat  melatonin 3 milliGRAM(s) Oral at bedtime PRN Insomnia  ondansetron Injectable 4 milliGRAM(s) IV Push every 6 hours PRN Nausea  oxycodone    5 mG/acetaminophen 325 mG 1 Tablet(s) Oral every 4 hours PRN Moderate Pain (4 - 6)  oxycodone    5 mG/acetaminophen 325 mG 2 Tablet(s) Oral every 6 hours PRN Severe Pain (7 - 10)    Pertinent Labs: 01-02 Na140 mmol/L Glu 104 mg/dL<H> K+ 3.9 mmol/L Cr  0.62 mg/dL BUN 8 mg/dL 01-02 Alb 2.1 g/dL<L>    Skin: WDL    Estimated Needs:   [x] no change since previous assessment on 12/29/21  [ ] recalculated:     Previous Nutrition Diagnosis:   Nutrition Diagnostic Terminology #1 Malnutrition...     Malnutrition Severe malnutrition in the context of chronic illness.     Etiology Inadequate protein-energy intake in setting of history of prostate CA.     Signs/Symptoms Physical findings of severe muscle wasting and fat loss as noted on 12/29 nutrition note    Goal/Expected Outcome Pt to consume >50% meals/supplements during LOS - goal not met    Nutrition Diagnosis is [x] ongoing  [ ] resolved [ ] not applicable     New Nutrition Diagnosis: [x] not applicable       Interventions:   Recommend  [x] Change Diet To: Low Na diet  [x] Nutrition Supplement: add Vital Cuisine Health Shake x 2/day (1040 kcal & 44 gm protein)  [ ] Nutrition Support  [ ] Other:     Monitoring and Evaluation:   [ x ] PO intake [ x ] Tolerance to diet prescription [ x ] weights [ x ] labs[ x ] follow up per protocol  [ ] other: Assessment: Pt admitted for hematuria/urinary retention; s/p cystoscopy 12/30, biopsy shows negative for urothelial carcinoma. Pt also with iron deficiency anemia s/p 2uPRBC, and new fever (tested + for COVID 01/02).  PMHx of HTN, and prostate cancer 2010 (was on radiation and went into remission).    Factors impacting intake: [ ] none [ ] nausea  [ ] vomiting [ ] diarrhea [ ] constipation  [ ]chewing problems [ ] swallowing issues  [x] other: persistent lack of appetite    Diet Prescription: Diet, Regular:   DASH/TLC {Sodium & Cholesterol Restricted} (12-30-21 @ 19:34)    Intake: < 50%    Current Weight: 54.5 kg (12/31), 54.4 kg (12/26)  % Weight Change Wt stable x 5 days; request current wt    Fluid accumulation: No noted edema as per flowsheets    Pertinent Medications: MEDICATIONS  (STANDING):  budesonide 160 MICROgram(s)/formoterol 4.5 MICROgram(s) Inhaler 2 Puff(s) Inhalation two times a day  tamsulosin 0.4 milliGRAM(s) Oral every 12 hours    MEDICATIONS  (PRN):  acetaminophen     Tablet .. 650 milliGRAM(s) Oral every 4 hours PRN Mild Pain (1 - 3)  benzocaine 15 mG/menthol 3.6 mG (Sugar-Free) Lozenge 1 Lozenge Oral three times a day PRN Sore Throat  melatonin 3 milliGRAM(s) Oral at bedtime PRN Insomnia  ondansetron Injectable 4 milliGRAM(s) IV Push every 6 hours PRN Nausea  oxycodone    5 mG/acetaminophen 325 mG 1 Tablet(s) Oral every 4 hours PRN Moderate Pain (4 - 6)  oxycodone    5 mG/acetaminophen 325 mG 2 Tablet(s) Oral every 6 hours PRN Severe Pain (7 - 10)    Pertinent Labs: 01-02 Na140 mmol/L Glu 104 mg/dL<H> K+ 3.9 mmol/L Cr  0.62 mg/dL BUN 8 mg/dL 01-02 Alb 2.1 g/dL<L>    Skin: WDL    Estimated Needs:   [x] no change since previous assessment on 12/29/21  [ ] recalculated:     Previous Nutrition Diagnosis:   Nutrition Diagnostic Terminology #1 Malnutrition...     Malnutrition Severe malnutrition in the context of chronic illness.     Etiology Inadequate protein-energy intake in setting of history of prostate CA.     Signs/Symptoms Physical findings of severe muscle wasting and fat loss as noted on nutrition note from 12/29    Goal/Expected Outcome Pt to consume >50% meals/supplements during LOS - goal not met    Nutrition Diagnosis is [x] ongoing  [ ] resolved [ ] not applicable     New Nutrition Diagnosis: [x] not applicable       Interventions:   Recommend  [x] Change Diet To: Low Na diet  [x] Nutrition Supplement: add Vital Cuisine Health Shake x 2/day (1040 kcal & 44 gm protein)  [ ] Nutrition Support  [x] Other: Encourage po intake    Monitoring and Evaluation:   [ x ] PO intake [ x ] Tolerance to diet prescription [ x ] weights [ x ] labs[ x ] follow up per protocol  [ ] other:

## 2022-01-03 NOTE — DISCHARGE NOTE PROVIDER - HOSPITAL COURSE
80 YO M with a sig PMHx of HTN, and prostate cancer 2010 (was an radiation and went into remission). Patient states that he has not been able to urinate since yesterday, causing him severe 10/10 abdominal pain. Hoskins catheter placed in the ER with significant urine on return. Urine became bloody after placement. Patient was admitted on 12/26/21.     12/27/21: Urine continues to be bloody but hoskins is draining well. HGB from 11-->10  12/28/21: Hoskins clots requiring irrigation multiple times throughout the night. Decided to change hoskins to 3-way and start CBI. HGB trends from 10-->9  12/29/21: Hoskins continues to clot requiring hand irrigation in addition to CBI. Patient is transfused 2u PRBCs.   12/30/21: Patient goes to OR for cystoscopy, clot evacuation and bladder biopsy  12/31/21: Hoskins continues to be bloody even on CBI  1/1/21: Urine clears, CBI held  1/2/21: CBI held, urine continues to be clear, HGB stabilizes  1/3/21: GREGOR    82 YO M with a sig PMHx of HTN, and prostate cancer 2010 (was an radiation and went into remission). Patient states that he has not been able to urinate since yesterday, causing him severe 10/10 abdominal pain. Hoskins catheter placed in the ER with significant urine on return. Urine became bloody after placement. Patient was admitted on 12/26/21.     12/27/21: Urine continues to be bloody but hoskins is draining well. HGB from 11-->10  12/28/21: Hoskins clots requiring irrigation multiple times throughout the night. Decided to change hoskins to 3-way and start CBI. HGB trends from 10-->9  12/29/21: Hoskins continues to clot requiring hand irrigation in addition to CBI. Patient is transfused 2u PRBCs.   12/30/21: Patient goes to OR for cystoscopy, clot evacuation and bladder biopsy  12/31/21: Hoskins continues to be bloody even on CBI  1/1/21: Urine clears, CBI held  1/2/21: CBI held, urine continues to be clear, HGB stabilizes  1/3/21: TOV, passed

## 2022-01-03 NOTE — DISCHARGE NOTE PROVIDER - NSDCCPCAREPLAN_GEN_ALL_CORE_FT
PRINCIPAL DISCHARGE DIAGNOSIS  Diagnosis: Hematuria  Assessment and Plan of Treatment:       SECONDARY DISCHARGE DIAGNOSES  Diagnosis: Urinary retention  Assessment and Plan of Treatment:

## 2022-01-03 NOTE — PROGRESS NOTE ADULT - SUBJECTIVE AND OBJECTIVE BOX
Patient seen and examined bedside resting comfortably.  No complaints offered.   ToV started 13:30  Denies nausea and vomiting. Tolerating diet.  Denies chest pain, dyspnea, cough.    T(F): 100.2 (01-03-22 @ 11:53), Max: 100.2 (01-03-22 @ 01:25)  HR: 96 (01-03-22 @ 11:53) (81 - 96)  BP: 92/58 (01-03-22 @ 11:53) (91/55 - 107/67)  RR: 17 (01-03-22 @ 11:53) (17 - 18)  SpO2: 96% (01-03-22 @ 11:53) (95% - 98%)    ROS:  Negative unless stated above    PHYSICAL EXAM:    General: NAD, alert and awake  HEENT: NCAT, EOMI, conjunctiva clear  Chest: nonlabored respirations, CTA b/l.  Abdomen: soft, NT/ND.   Extremities: Calf soft, nontender b/l.   : No suprapubic tenderness or bladder distention.  Indwelling hoskins with clear yellow urine removed, currently undergoing ToV    LABS:                        9.9    7.23  )-----------( 210      ( 03 Jan 2022 11:15 )             29.7   01-02    140  |  104  |  8   ----------------------------<  104<H>  3.9   |  32<H>  |  0.62    Ca    8.5      02 Jan 2022 09:16    TPro  5.7<L>  /  Alb  2.1<L>  /  TBili  0.2  /  DBili  x   /  AST  12<L>  /  ALT  12  /  AlkPhos  43  01-02    I&O's Detail

## 2022-01-03 NOTE — DISCHARGE NOTE PROVIDER - NSDCCPTREATMENT_GEN_ALL_CORE_FT
PRINCIPAL PROCEDURE  Procedure: Cystoscopy, with clot evacuation  Findings and Treatment:       SECONDARY PROCEDURE  Procedure: Bladder biopsy  Findings and Treatment:

## 2022-01-03 NOTE — DISCHARGE NOTE PROVIDER - DID THE PATIENT PRESENT WITH OR WAS TREATED FOR MALNUTRITION DURING THIS ADMISSION
Yes... 1 yo female with hx of seizures diagnosed with May 2018 on keppra and tegretol who presents with frequent drop seizures up to 15 times a day for the past 2 weeks. no fevers, no vomiting, no cough or congestion.  Patient sent into ER for admission to neurology.  Mercy Umaña MD

## 2022-01-03 NOTE — DISCHARGE NOTE PROVIDER - NSDCCAREPROVSEEN_GEN_ALL_CORE_FT
Declan, Guido Felton, Opal Gasca, Adrienne Jackson, Jennifer Reyes, Lakshmi Fisher, Rigoberto Taylor, Sagar Hennessy, Debra

## 2022-01-03 NOTE — PROGRESS NOTE ADULT - ASSESSMENT
80 YO M with a sig PMHx of HTN, and prostate cancer 2010 (was an radiation and went into remission). Consult called for urinary retention. Switched to CBI 12/28.   Anemia s/p 2uPRBC  Now POD #4 s/p Cystoscopy with fulguration, clot evacuation and bladder biopsy. HD Stable. H/H stable. Winchester removed 1/3/22    Plan:  --Reg diet as tolerated  --Currently undergoing ToV  --incentive spirometer, DVT ppx, OOB/ambulate as tolerated  --Appreciate medicine co management   --F/u labs, trend H/H  --Will d/w Dr. Taylor

## 2022-01-03 NOTE — DISCHARGE NOTE PROVIDER - CARE PROVIDER_API CALL
Sagar Taylor)  Urology  [address] [place] [address]  Phone: [phone]  Fax: [phone]  Follow Up Time: 2 weeks

## 2022-01-03 NOTE — PROGRESS NOTE ADULT - ASSESSMENT
80 YO M with a sig PMHx of HTN, and prostate cancer 2010 (was an radiation and went into remission). Consult called for urinary retention. Switched to CBI 12/28.   Anemia s/p 2uPRBC  Now POD #4 s/p Cystoscopy with fulguration, clot evacuation and bladder biopsy. HD Stable. H/H stable     New fever  Positive exposure to prior roommate  Will retest for COVID    Urinary retention s/p cystoscopy  Monitor CBI, as per urologist  Biopsy shows negative for urothelial carcinoma  Monitor H/H-stable  acute blood loss anemia  Gross hematuria    Iron deficiency anemia  Venofer X3. Transition to PO iron after on discharge.    HTN  (currently stable)   - CW IVFs,   - Hypotension 2/2 to multifactorial --> anemia    prostate cancer  < from: CT Abdomen and Pelvis w/ IV Cont (12.26.21 @ 22:13) >  IMPRESSION:  Winchester catheter within the urinary bladder. Diffuse heterogeneous material   within the urinary bladder likely representing hemorrhage.  -Flomax       80 YO M with a sig PMHx of HTN, and prostate cancer 2010 (was an radiation and went into remission). Consult called for urinary retention. Switched to CBI 12/28.   Anemia s/p 2uPRBC  Now POD #4 s/p Cystoscopy with fulguration, clot evacuation and bladder biopsy.    COVID19  Pos still on 1/2  Low grade fevers  Positive exposure to prior roommate      Urinary retention s/p cystoscopy  Monitor CBI, as per urologist  Biopsy shows negative for urothelial carcinoma  Monitor H/H-stable  acute blood loss anemia  Gross hematuria    Iron deficiency anemia  Venofer X3. Transition to PO iron after on discharge.    HTN  (currently stable)   - CW IVFs,   - Hypotension 2/2 to multifactorial --> anemia    prostate cancer  < from: CT Abdomen and Pelvis w/ IV Cont (12.26.21 @ 22:13) >  IMPRESSION:  Winchester catheter within the urinary bladder. Diffuse heterogeneous material   within the urinary bladder likely representing hemorrhage.  -Flomax       82 YO M with a sig PMHx of HTN, and prostate cancer 2010 (was an radiation and went into remission). Consult called for urinary retention. Switched to CBI 12/28.   Anemia s/p 2uPRBC  Now POD #4 s/p Cystoscopy with fulguration, clot evacuation and bladder biopsy.    COVID19  Pos still on 1/2  Low grade fevers  Positive exposure to prior roommate      Urinary retention s/p cystoscopy  s/p CBI, plan to remove hoskins and do TOV today   Biopsy shows negative for urothelial carcinoma  Monitor H/H-stable  acute blood loss anemia  Urology following    Iron deficiency anemia  Venofer X3. Transition to PO iron after on discharge.    HTN  (currently stable)   - CW IVFs,   - Hypotension 2/2 to multifactorial --> anemia    prostate cancer  < from: CT Abdomen and Pelvis w/ IV Cont (12.26.21 @ 22:13) >  IMPRESSION:  Hoskins catheter within the urinary bladder. Diffuse heterogeneous material   within the urinary bladder likely representing hemorrhage.  -Flomax

## 2022-01-04 VITALS
OXYGEN SATURATION: 97 % | RESPIRATION RATE: 18 BRPM | TEMPERATURE: 99 F | DIASTOLIC BLOOD PRESSURE: 63 MMHG | SYSTOLIC BLOOD PRESSURE: 102 MMHG | HEART RATE: 104 BPM

## 2022-01-04 LAB
ANION GAP SERPL CALC-SCNC: 3 MMOL/L — LOW (ref 5–17)
BUN SERPL-MCNC: 16 MG/DL — SIGNIFICANT CHANGE UP (ref 7–23)
CALCIUM SERPL-MCNC: 9.2 MG/DL — SIGNIFICANT CHANGE UP (ref 8.5–10.1)
CHLORIDE SERPL-SCNC: 100 MMOL/L — SIGNIFICANT CHANGE UP (ref 96–108)
CO2 SERPL-SCNC: 33 MMOL/L — HIGH (ref 22–31)
CREAT SERPL-MCNC: 0.81 MG/DL — SIGNIFICANT CHANGE UP (ref 0.5–1.3)
GLUCOSE SERPL-MCNC: 108 MG/DL — HIGH (ref 70–99)
HCT VFR BLD CALC: 32.4 % — LOW (ref 39–50)
HGB BLD-MCNC: 10.7 G/DL — LOW (ref 13–17)
MCHC RBC-ENTMCNC: 30.5 PG — SIGNIFICANT CHANGE UP (ref 27–34)
MCHC RBC-ENTMCNC: 33 GM/DL — SIGNIFICANT CHANGE UP (ref 32–36)
MCV RBC AUTO: 92.3 FL — SIGNIFICANT CHANGE UP (ref 80–100)
NRBC # BLD: 0 /100 WBCS — SIGNIFICANT CHANGE UP (ref 0–0)
PLATELET # BLD AUTO: 242 K/UL — SIGNIFICANT CHANGE UP (ref 150–400)
POTASSIUM SERPL-MCNC: 4.1 MMOL/L — SIGNIFICANT CHANGE UP (ref 3.5–5.3)
POTASSIUM SERPL-SCNC: 4.1 MMOL/L — SIGNIFICANT CHANGE UP (ref 3.5–5.3)
RBC # BLD: 3.51 M/UL — LOW (ref 4.2–5.8)
RBC # FLD: 14.3 % — SIGNIFICANT CHANGE UP (ref 10.3–14.5)
SODIUM SERPL-SCNC: 136 MMOL/L — SIGNIFICANT CHANGE UP (ref 135–145)
WBC # BLD: 13.8 K/UL — HIGH (ref 3.8–10.5)
WBC # FLD AUTO: 13.8 K/UL — HIGH (ref 3.8–10.5)

## 2022-01-04 PROCEDURE — 99233 SBSQ HOSP IP/OBS HIGH 50: CPT

## 2022-01-04 RX ORDER — PHENAZOPYRIDINE HCL 100 MG
2 TABLET ORAL
Qty: 12 | Refills: 0
Start: 2022-01-04 | End: 2022-01-06

## 2022-01-04 RX ORDER — AZTREONAM 2 G
1 VIAL (EA) INJECTION
Qty: 10 | Refills: 0
Start: 2022-01-04 | End: 2022-01-08

## 2022-01-04 RX ADMIN — TAMSULOSIN HYDROCHLORIDE 0.4 MILLIGRAM(S): 0.4 CAPSULE ORAL at 17:26

## 2022-01-04 RX ADMIN — Medication 650 MILLIGRAM(S): at 02:14

## 2022-01-04 RX ADMIN — OXYCODONE AND ACETAMINOPHEN 2 TABLET(S): 5; 325 TABLET ORAL at 18:00

## 2022-01-04 RX ADMIN — TAMSULOSIN HYDROCHLORIDE 0.4 MILLIGRAM(S): 0.4 CAPSULE ORAL at 05:36

## 2022-01-04 RX ADMIN — OXYCODONE AND ACETAMINOPHEN 2 TABLET(S): 5; 325 TABLET ORAL at 02:54

## 2022-01-04 RX ADMIN — Medication 650 MILLIGRAM(S): at 02:44

## 2022-01-04 RX ADMIN — OXYCODONE AND ACETAMINOPHEN 2 TABLET(S): 5; 325 TABLET ORAL at 03:24

## 2022-01-04 RX ADMIN — BUDESONIDE AND FORMOTEROL FUMARATE DIHYDRATE 2 PUFF(S): 160; 4.5 AEROSOL RESPIRATORY (INHALATION) at 05:36

## 2022-01-04 NOTE — DISCHARGE NOTE NURSING/CASE MANAGEMENT/SOCIAL WORK - NSDCPEFALRISK_GEN_ALL_CORE
For information on Fall & Injury Prevention, visit: [email or web address] OR  [email or web address] OR  https://www.cdc.gov/steadi/patient.html

## 2022-01-04 NOTE — DISCHARGE NOTE NURSING/CASE MANAGEMENT/SOCIAL WORK - PATIENT PORTAL LINK FT
You can access the FollowMyHealth Patient Portal offered by [facility] by registering at the following website: [email or web address]. By joining [employer]’s FollowMyHealth portal, you will also be able to view your health information using other applications (apps) compatible with our system.

## 2022-01-04 NOTE — PROGRESS NOTE ADULT - NUTRITIONAL ASSESSMENT
This patient has been assessed with a concern for Malnutrition and has been determined to have a diagnosis/diagnoses of Severe protein-calorie malnutrition and Underweight (BMI < 19).    This patient is being managed with:   Diet Regular-  DASH/TLC {Sodium & Cholesterol Restricted}  Entered: Dec 30 2021  7:34PM    
This patient has been assessed with a concern for Malnutrition and has been determined to have a diagnosis/diagnoses of Severe protein-calorie malnutrition and Underweight (BMI < 19).    This patient is being managed with:   Diet Regular-  DASH/TLC {Sodium & Cholesterol Restricted}  Entered: Dec 30 2021  7:34PM    
This patient has been assessed with a concern for Malnutrition and has been determined to have a diagnosis/diagnoses of Severe protein-calorie malnutrition and Underweight (BMI < 19).    This patient is being managed with:   Diet Regular-  Low Sodium  Supplement Feeding Modality:  Oral  Health Shake Cans or Servings Per Day:  1       Frequency:  Two Times a day  Entered: Akshat  3 2022 10:29AM

## 2022-01-04 NOTE — PROGRESS NOTE ADULT - SUBJECTIVE AND OBJECTIVE BOX
Patient is a 81y old  Male who presents with a chief complaint of Hematuria/Urinary retention (02 Jan 2022 14:27)    INTERVAL HPI/OVERNIGHT EVENTS: Patients doing well, has no complaints, hoskins still in    Continues to have Low grade fevers, hypotensive -but seems at baseline    MEDICATIONS  (STANDING):  budesonide 160 MICROgram(s)/formoterol 4.5 MICROgram(s) Inhaler 2 Puff(s) Inhalation two times a day  tamsulosin 0.4 milliGRAM(s) Oral every 12 hours    MEDICATIONS  (PRN):  acetaminophen     Tablet .. 650 milliGRAM(s) Oral every 4 hours PRN Mild Pain (1 - 3)  benzocaine 15 mG/menthol 3.6 mG (Sugar-Free) Lozenge 1 Lozenge Oral three times a day PRN Sore Throat  melatonin 3 milliGRAM(s) Oral at bedtime PRN Insomnia  ondansetron Injectable 4 milliGRAM(s) IV Push every 6 hours PRN Nausea  oxycodone    5 mG/acetaminophen 325 mG 1 Tablet(s) Oral every 4 hours PRN Moderate Pain (4 - 6)  oxycodone    5 mG/acetaminophen 325 mG 2 Tablet(s) Oral every 6 hours PRN Severe Pain (7 - 10)      Allergies    No Known Allergies    Intolerances      REVIEW OF SYSTEMS:  All other systems reviewed and are negative    ICU Vital Signs Last 24 Hrs  T(C): 37.2 (04 Jan 2022 12:14), Max: 38.2 (04 Jan 2022 01:25)  T(F): 99 (04 Jan 2022 12:14), Max: 100.7 (04 Jan 2022 01:25)  HR: 99 (04 Jan 2022 12:14) (83 - 99)  BP: 96/60 (04 Jan 2022 12:14) (93/57 - 134/78)  BP(mean): --  ABP: --  ABP(mean): --  RR: 18 (04 Jan 2022 12:14) (18 - 19)  SpO2: 97% (04 Jan 2022 12:14) (97% - 99%)      PHYSICAL EXAM:  GENERAL: NAD, well-groomed, well-developed  HEAD:  Atraumatic, Normocephalic  EYES: EOMI, PERRLA, conjunctiva and sclera clear  ENMT: No tonsillar erythema, exudates, or enlargement; Moist mucous membranes, Good dentition, No lesions  NECK: Supple, No JVD, Normal thyroid  NERVOUS SYSTEM:  Alert & Oriented X3, Good concentration; Motor Strength 5/5 B/L upper and lower extremities; DTRs 2+ intact and symmetric  CHEST/LUNG: Clear to percussion bilaterally; No rales, rhonchi, wheezing, or rubs  HEART: Regular rate and rhythm; No murmurs, rubs, or gallops  ABDOMEN: Soft, Nontender, Nondistended; Bowel sounds present, hoskins present -urine yellow, some tinge of red blood   EXTREMITIES:  2+ Peripheral Pulses, No clubbing, cyanosis, or edema  LYMPH: No lymphadenopathy noted  SKIN: No rashes or lesions    Labs                                                              10.7   13.80 )-----------( 242      ( 04 Jan 2022 11:29 )             32.4     01-04    136  |  100  |  16  ----------------------------<  108<H>  4.1   |  33<H>  |  0.81    Ca    9.2      04 Jan 2022 11:29                                           Patient is a 81y old  Male who presents with a chief complaint of Hematuria/Urinary retention (02 Jan 2022 14:27)    INTERVAL HPI/OVERNIGHT EVENTS: Patients doing well, has no complaints,  Continues to have Low grade fevers, hypotensive -but seems at baseline  TOV passed    MEDICATIONS  (STANDING):  budesonide 160 MICROgram(s)/formoterol 4.5 MICROgram(s) Inhaler 2 Puff(s) Inhalation two times a day  tamsulosin 0.4 milliGRAM(s) Oral every 12 hours    MEDICATIONS  (PRN):  acetaminophen     Tablet .. 650 milliGRAM(s) Oral every 4 hours PRN Mild Pain (1 - 3)  benzocaine 15 mG/menthol 3.6 mG (Sugar-Free) Lozenge 1 Lozenge Oral three times a day PRN Sore Throat  melatonin 3 milliGRAM(s) Oral at bedtime PRN Insomnia  ondansetron Injectable 4 milliGRAM(s) IV Push every 6 hours PRN Nausea  oxycodone    5 mG/acetaminophen 325 mG 1 Tablet(s) Oral every 4 hours PRN Moderate Pain (4 - 6)  oxycodone    5 mG/acetaminophen 325 mG 2 Tablet(s) Oral every 6 hours PRN Severe Pain (7 - 10)      Allergies    No Known Allergies    Intolerances      REVIEW OF SYSTEMS:  All other systems reviewed and are negative    ICU Vital Signs Last 24 Hrs  T(C): 37.2 (04 Jan 2022 12:14), Max: 38.2 (04 Jan 2022 01:25)  T(F): 99 (04 Jan 2022 12:14), Max: 100.7 (04 Jan 2022 01:25)  HR: 99 (04 Jan 2022 12:14) (83 - 99)  BP: 96/60 (04 Jan 2022 12:14) (93/57 - 134/78)  BP(mean): --  ABP: --  ABP(mean): --  RR: 18 (04 Jan 2022 12:14) (18 - 19)  SpO2: 97% (04 Jan 2022 12:14) (97% - 99%)      PHYSICAL EXAM:  GENERAL: NAD, well-groomed, well-developed  HEAD:  Atraumatic, Normocephalic  EYES: EOMI, PERRLA, conjunctiva and sclera clear  ENMT: No tonsillar erythema, exudates, or enlargement; Moist mucous membranes, Good dentition, No lesions  NECK: Supple, No JVD, Normal thyroid  NERVOUS SYSTEM:  Alert & Oriented X3, Good concentration; Motor Strength 5/5 B/L upper and lower extremities; DTRs 2+ intact and symmetric  CHEST/LUNG: Clear to percussion bilaterally; No rales, rhonchi, wheezing, or rubs  HEART: Regular rate and rhythm; No murmurs, rubs, or gallops  ABDOMEN: Soft, Nontender, Nondistended; Bowel sounds present, condom cathether- has yellow urine  EXTREMITIES:  2+ Peripheral Pulses, No clubbing, cyanosis, or edema  LYMPH: No lymphadenopathy noted  SKIN: No rashes or lesions    Labs                                                              10.7   13.80 )-----------( 242      ( 04 Jan 2022 11:29 )             32.4     01-04    136  |  100  |  16  ----------------------------<  108<H>  4.1   |  33<H>  |  0.81    Ca    9.2      04 Jan 2022 11:29

## 2022-01-04 NOTE — PROGRESS NOTE ADULT - ASSESSMENT
82 YO M with a sig PMHx of HTN, and prostate cancer 2010 (was an radiation and went into remission). Consult called for urinary retention. Switched to CBI 12/28.   Anemia s/p 2uPRBC  Now POD #4 s/p Cystoscopy with fulguration, clot evacuation and bladder biopsy.    COVID19  Pos still on 1/2  Low grade fevers  Positive exposure to prior roommate  Otherwise pt stable, on room air       Urinary retention s/p cystoscopy  s/p CBI, plan to remove hoskins and do TOV today   Biopsy shows negative for urothelial carcinoma  Monitor H/H-stable  acute blood loss anemia  Urology following    Iron deficiency anemia  Venofer X3. Transition to PO iron after on discharge.    HTN  (currently stable)   - CW IVFs,   - Hypotension 2/2 to multifactorial --> anemia    prostate cancer  < from: CT Abdomen and Pelvis w/ IV Cont (12.26.21 @ 22:13) >  IMPRESSION:  Hoskins catheter within the urinary bladder. Diffuse heterogeneous material   within the urinary bladder likely representing hemorrhage.  -Flomax    Stable for discharge from medical standpoint. Low grade fevers ok since still undergoing covid.    80 YO M with a sig PMHx of HTN, and prostate cancer 2010 (was an radiation and went into remission). Consult called for urinary retention. Switched to CBI 12/28.   Anemia s/p 2uPRBC  Now POD #5 s/p Cystoscopy with fulguration, clot evacuation and bladder biopsy.    COVID19  Pos still on 1/2  Low grade fevers  Positive exposure to prior roommate  Otherwise pt stable, on room air   Leukocytosis mildly elevated today-most likely reactive, monitor outpatient      Urinary retention s/p cystoscopy  s/p CBI, plan to remove hoskins and do TOV today   Biopsy shows negative for urothelial carcinoma  Monitor H/H-stable  acute blood loss anemia  Urology following    Iron deficiency anemia  Venofer X3. Transition to PO iron after on discharge.    HTN  (currently stable)   - CW IVFs,   - Hypotension 2/2 to multifactorial --> anemia    prostate cancer  < from: CT Abdomen and Pelvis w/ IV Cont (12.26.21 @ 22:13) >  IMPRESSION:  Hoskins catheter within the urinary bladder. Diffuse heterogeneous material   within the urinary bladder likely representing hemorrhage.  -Flomax    Stable for discharge from medical standpoint. Low grade fevers ok since still undergoing covid. Monitor vitals/ labs outpatient

## 2022-01-04 NOTE — PROGRESS NOTE ADULT - REASON FOR ADMISSION
Hematuria/Urinary retention

## 2022-01-13 DIAGNOSIS — Z85.46 PERSONAL HISTORY OF MALIGNANT NEOPLASM OF PROSTATE: ICD-10-CM

## 2022-01-13 DIAGNOSIS — I10 ESSENTIAL (PRIMARY) HYPERTENSION: ICD-10-CM

## 2022-01-13 DIAGNOSIS — D50.9 IRON DEFICIENCY ANEMIA, UNSPECIFIED: ICD-10-CM

## 2022-01-13 DIAGNOSIS — R33.9 RETENTION OF URINE, UNSPECIFIED: ICD-10-CM

## 2022-01-13 DIAGNOSIS — I95.9 HYPOTENSION, UNSPECIFIED: ICD-10-CM

## 2022-01-13 DIAGNOSIS — U07.1 COVID-19: ICD-10-CM

## 2022-01-13 DIAGNOSIS — R31.0 GROSS HEMATURIA: ICD-10-CM

## 2022-01-13 DIAGNOSIS — E43 UNSPECIFIED SEVERE PROTEIN-CALORIE MALNUTRITION: ICD-10-CM

## 2022-01-13 DIAGNOSIS — D62 ACUTE POSTHEMORRHAGIC ANEMIA: ICD-10-CM

## 2022-10-22 NOTE — DISCHARGE NOTE PROVIDER - DETAILS OF MALNUTRITION DIAGNOSIS/DIAGNOSES
normal...
This patient has been assessed with a concern for Malnutrition and was treated during this hospitalization for the following Nutrition diagnosis/diagnoses:     -  12/29/2021: Severe protein-calorie malnutrition   -  12/29/2021: Underweight (BMI < 19)

## 2024-04-06 NOTE — ED ADULT NURSE NOTE - IS THE PATIENT ABLE TO BE SCREENED?
[facility] Consult       Name: Obi Lawrence    Medical Record #: [id number]    YOB: 1954    Date/Time: 4/6/2024/8:31 AM    Reason for Consultation: Anthony Orosco MD has asked me to evaluate Obi Lawrence regarding rising lfts.    HPI: Patient is 69-year-old male with metastatic stage IV pancreatic adenocarcinoma dx fall 2023, following at , with mets to the liver, lungs, mediastinum, history of DVT on lovenox recently held for ERCP 4/2, admitted with increased left lower extremity edema.  He is found to have extension of left lower extremity DVT.  He has been placed on heparin with plan for thrombectomy by IR.  He has had recent increase in LFTs.  He underwent ERCP with Dr. Washington for this at the [facility] on 4/2.  Sphincterotomy was performed.  3 plastic stents were placed.  Recommendation was for repeat in 1 month for stent exchange.  He was noted to have multifocal intrahepatic stenoses.  It was not felt that metal stent placement would be feasible due to the extent of metastatic disease.  Here his LFTs are noted to have increased.  T. bili 15 from 11, alk phos 1672 from 1299.  His white count has also been rising over the last month.  He denies fevers or chills.  He denies any change in baseline mild abdominal discomfort.    Patient Active Problem List   Diagnosis    Overlapping malignant neoplasm of pancreas (H)    Hyperbilirubinemia    Acute deep vein thrombosis (DVT) of femoral vein of both lower extremities (H)    Metastasis from pancreatic cancer (H)          Review of Systems (ROS): Pertinent items are noted in HPI.    Past Medical History:  No past medical history on file.    Medications:   Current Outpatient Medications   Medication Sig Dispense Refill    Ascorbic Acid 500 MG/5ML LIQD Take 500 mg by mouth every 48 hours      cetirizine (ZYRTEC) 10 MG tablet Take 10 mg by mouth daily      enoxaparin ANTICOAGULANT (LOVENOX) 120 MG/0.8ML syringe Inject 120 mg  "Subcutaneous      glucosamine 500 MG CAPS capsule Take 500 mg by mouth daily      HYDROmorphone (DILAUDID) 2 MG tablet Take 1-2 tablets (2-4 mg) by mouth every 4 hours as needed for moderate to severe pain 60 tablet 0    lipase-protease-amylase (CREON 36) [id number] units CPEP Take 2 capsules by mouth 3 times daily (with meals) for 90 days 540 capsule 0    Oyster Shell (OYSTER CALCIUM PO) Take 1 tablet by mouth daily Total 1000mg      prochlorperazine (COMPAZINE) 10 MG tablet Take 0.5 tablets (5 mg) by mouth every 6 hours as needed for nausea or vomiting 30 tablet 2    Vitamin D, Cholecalciferol, 25 MCG (1000 UT) CAPS Take 2 capsules by mouth 3 times daily      vitamin E (TOCOPHEROL) 400 units (180 mg) capsule Take 180 capsules by mouth daily         Allergies: Iodinated contrast media    Family History:  No family history on file.    Social History:  Social History     Socioeconomic History    Marital status:      Spouse name: Not on file    Number of children: Not on file    Years of education: Not on file    Highest education level: Not on file   Occupational History    Not on file   Tobacco Use    Smoking status: Never     Passive exposure: Past (Growing up both parents, old work lunch room)    Smokeless tobacco: Never   Substance and Sexual Activity    Alcohol use: Not Currently     Comment: none since July    Drug use: Never    Sexual activity: Not on file   Other Topics Concern    Not on file   Social History Narrative    Not on file     Social Determinants of Health     Financial Resource Strain: Not on file   Food Insecurity: Not on file   Transportation Needs: Not on file   Physical Activity: Not on file   Stress: Not on file   Social Connections: Not on file   Interpersonal Safety: Not on file   Housing Stability: Not on file       PHYSICAL EXAMINATION:  /62 (BP Location: Right arm)   Pulse 71   Temp 97.8  F (36.6  C) (Oral)   Resp 20   Ht 1.753 m (5' 9\")   Wt 82.1 kg (181 lb)  "  SpO2 94%   BMI 26.73 kg/m   Body mass index is 26.73 kg/m .  General:  NAD  Gastrointestinal: soft, nontender  Musculoskeletal: ++ LLE swelling  Neuro: Alert and oriented.  No gross focal abnormalities.  Psych: Mood and affect normal.      I have reviewed recent laboratory studies:    LABORATORY DATA:  Recent Labs   Lab 04/06/24  0635 04/05/24  1452 04/05/24  1150   WBC 40.6* 43.0* 40.2*   RBC 2.94* 3.41* 3.32*   HGB 9.2* 10.6* 10.2*   HCT 28.0* 33.2* 31.7*   MCV 95 97 96   MCH 31.3 31.1 30.7   MCHC 32.9 31.9 32.2   RDW 22.5* 22.5* 22.4*   * 93* 133*      Recent Labs   Lab 04/06/24  0635 04/05/24  1150 04/02/24  1015   * 129* 130*   CO2 24 21* 21*   BUN 33.0* 37.7* 21.9     Recent Labs   Lab 04/06/24  0635 04/05/24  1150 04/02/24  1015   ALKPHOS 1,552* 1,672* 1,299*   * 125* 106*   ALT 60 70 58     Lab Results   Component Value Date    INR 2.93 (H) 04/05/2024    INR 2.78 (H) 04/02/2024       Radiology:   US Abdomen Limited (RUQ) [[id number]] Collected: 04/05/24 1451   Order Status: Completed Updated: 04/05/24 1512   Narrative:     EXAM: US ABDOMEN LIMITED  LOCATION: [facility]  DATE: 4/5/2024    INDICATION: Increasing bilirubin, recent biliary stent placement  COMPARISON: Abdominal ultrasound 03/28/2024, MRI 03/26/2024  TECHNIQUE: Limited abdominal ultrasound.    FINDINGS:    GALLBLADDER: The gallbladder is contracted. No shadowing gallstones.    BILE DUCTS: No intrahepatic biliary ductal dilation. The common bile duct measures up to 4 mm. Known biliary stents are not well visualized.    LIVER: The infiltrative centrally necrotic masses within the right and left hepatic lobes are unchanged compared to recent ultrasound and MRI accounting for differences in technique, with the largest component measuring 12.5 x 9.5 x 9.1 cm  sonographically. The main portal vein is patent with hepatopedal flow.    RIGHT KIDNEY: No hydronephrosis.    PANCREAS: The pancreas is largely  obscured by overlying gas.    Trace ascites.   Impression:     IMPRESSION:  1.  No sonographic findings of intra or extrahepatic biliary ductal dilation. Known biliary stents are not well visualized.  2.  Unchanged large heterogeneous bilobar liver metastases.     EXAM: US LOWER EXTREMITY VENOUS DUPLEX BILATERAL  LOCATION: [facility]  DATE: 4/5/2024    INDICATION: Patient with bilateral lower extremity swelling anasarca, history of left sided DVT with interruption of anticoagulation for EGD  COMPARISON: 02/09/2024  TECHNIQUE: Venous Duplex ultrasound of bilateral lower extremities with and without compression, augmentation and duplex. Color flow and spectral Doppler with waveform analysis performed.    FINDINGS: Exam includes the common femoral, femoral, popliteal veins as well as segmentally visualized deep calf veins and greater saphenous vein.    RIGHT: There is occlusive thrombus within the right posterior tibial and one of the paired peroneal veins. No extension into the popliteal vein or additional DVT. No popliteal cyst.    LEFT: Extensive deep venous thrombosis throughout the left lower extremity deep venous system, including occlusive thrombus in the common femoral vein extending into the visualized profunda femoris as well as into the femoral, popliteal, posterior  tibial, and peroneal veins.  No popliteal cyst.   Impression:     IMPRESSION:  1.  Extensive left lower extremity deep venous thrombosis extending from the common femoral vein through the visualized proximal profunda femoris, femoral vein, popliteal vein, and calf veins.  2.  Thrombus within the right posterior tibial and one of the paired peroneal veins in the right calf.      [Critical Result: DVT]    Finding was identified on 4/5/2024 2:43 PM CDT.    Dr. Clayton was contacted by me on 4/5/2024 2:52 PM CDT and verbalized understanding of the critical result.     ERCP 4/2/24 at  with      - The major papilla  appeared normal.                          - Selective biliary cannulation. Biliary                          sphincterotomy performed                          - Cholangiogram showed multifocal intrahepatic                          stenoses (Bismuth IVb)                          - The left main hepatic duct and the right                          intrahepatic branches were successfully dilated to 4                          mm.                          - One 7 Fr x 18 cm plastic stent was placed into the                          left hepatic duct (segment 3).                          - One 7 Fr x 15 cm plastic stent was placed into the                          right posterior hepatic duct (segment VI).                          - One 7 Fr x 15 cm plastic stent was placed into the                          right posterior hepatic duct (segment VII.   Recommendation:        - Discharge patient to home (ambulatory).                          - Monitor for improvement in LFTs. Will check next                          week to ensure downward trend.                          - Repeat ERCP in 1 month to exchange/upsize stents.                          Likely not possible to place permanant uncovered metal                          stents due to multifocal nature of                          metastases/obstruction.                          - Hold Lovenox for 3 more days. Restart on 4/6/24                          - Resume home medications and regular diet today                          - Above discussed with patient and family   Impression:   Stage IV metastatic pancreatic cancer with mets to liver, lung, mediastinum, followed at   Increased lfts - s/p ercp at  4/2 w/ sphincterotomy and placement of 3 plastic stents; multifocal intrahepatic stenosis seen; noted rising wbc over last month, no clear evidence cholangitis; no desire dil on ultrasound, could be from known extensive metastatic test, recommend noncontrast CT to eval stent  position and ultimately follow up with   Extension of LLE DVT - on heparin, plan for thrombectomy with IR today  4.   Leukocytosis - rising wbc over the last month, afebrile, no increase abdominal pain; unclear if this is 2/2 underlying malignancy or infection    Recommendation:   Noncontrast CT abdomen/pelvis  Trend lfts  Management of DVT as per primary/IR  Follow wbc - will defer antibiotics/eval to primary  Ultimately anticipate follow up with  GI Dr Washington to determine if further biliary intervention would be of benefit; no plan for repeat ERCP this admission at this time  Follow up with  oncology    Discussed with EVANS biliary MD.  60 minutes of total time was spent providing patient care, including patient evaluation, reviewing documentation/tests, and .    Marissa Sharp MD  4/6/2024/8:31 AM  [facility]    Yes

## 2024-10-04 NOTE — DIETITIAN INITIAL EVALUATION ADULT. - FEEDING SKILL
Problem: Chronic Conditions and Co-morbidities  Goal: Patient's chronic conditions and co-morbidity symptoms are monitored and maintained or improved  Outcome: Progressing     Problem: Pain  Goal: Verbalizes/displays adequate comfort level or baseline comfort level  Outcome: Progressing     Problem: Skin/Tissue Integrity  Goal: Absence of new skin breakdown  Description: 1.  Monitor for areas of redness and/or skin breakdown  2.  Assess vascular access sites hourly  3.  Every 4-6 hours minimum:  Change oxygen saturation probe site  4.  Every 4-6 hours:  If on nasal continuous positive airway pressure, respiratory therapy assess nares and determine need for appliance change or resting period.  Outcome: Progressing     Problem: Safety - Adult  Goal: Free from fall injury  Outcome: Progressing     Problem: ABCDS Injury Assessment  Goal: Absence of physical injury  Outcome: Progressing     Problem: Nutrition Deficit:  Goal: Optimize nutritional status  Outcome: Progressing      independent